# Patient Record
Sex: FEMALE | Race: WHITE | NOT HISPANIC OR LATINO
[De-identification: names, ages, dates, MRNs, and addresses within clinical notes are randomized per-mention and may not be internally consistent; named-entity substitution may affect disease eponyms.]

---

## 2020-08-31 ENCOUNTER — ERX REFILL RESPONSE (OUTPATIENT)
Age: 45
End: 2020-08-31

## 2020-08-31 RX ORDER — FOLIC ACID 1 MG/1
TAKE 1 TABLET BY MOUTH EVERY DAY TABLET ORAL
Qty: 90 | Refills: 3

## 2020-09-30 ENCOUNTER — TELEPHONE ENCOUNTER (OUTPATIENT)
Dept: URBAN - METROPOLITAN AREA CLINIC 92 | Facility: CLINIC | Age: 45
End: 2020-09-30

## 2020-09-30 ENCOUNTER — OFFICE VISIT (OUTPATIENT)
Dept: URBAN - METROPOLITAN AREA TELEHEALTH 2 | Facility: TELEHEALTH | Age: 45
End: 2020-09-30
Payer: COMMERCIAL

## 2020-09-30 DIAGNOSIS — T88.7XXA DRUG REACTION: ICD-10-CM

## 2020-09-30 DIAGNOSIS — D50.8 ANEMIA, DUE TO INADEQUATE IRON INTAKE: ICD-10-CM

## 2020-09-30 DIAGNOSIS — K51.00 UNIVERSAL ULCERATIVE COLITIS WITHOUT COMPLICATION: ICD-10-CM

## 2020-09-30 DIAGNOSIS — Z09 FOLLOW UP: ICD-10-CM

## 2020-09-30 DIAGNOSIS — D50.9 IRON (FE) DEFICIENCY ANEMIA: ICD-10-CM

## 2020-09-30 DIAGNOSIS — K92.1 HEMATOCHEZIA: ICD-10-CM

## 2020-09-30 DIAGNOSIS — R19.7 DIARRHEA: ICD-10-CM

## 2020-09-30 DIAGNOSIS — M25.50 ARTHRALGIA: ICD-10-CM

## 2020-09-30 PROCEDURE — G9903 PT SCRN TBCO ID AS NON USER: HCPCS | Performed by: INTERNAL MEDICINE

## 2020-09-30 PROCEDURE — 86480 TB TEST CELL IMMUN MEASURE: CPT | Performed by: INTERNAL MEDICINE

## 2020-09-30 PROCEDURE — G8427 DOCREV CUR MEDS BY ELIG CLIN: HCPCS | Performed by: INTERNAL MEDICINE

## 2020-09-30 PROCEDURE — G8482 FLU IMMUNIZE ORDER/ADMIN: HCPCS | Performed by: INTERNAL MEDICINE

## 2020-09-30 PROCEDURE — 3017F COLORECTAL CA SCREEN DOC REV: CPT | Performed by: INTERNAL MEDICINE

## 2020-09-30 PROCEDURE — G8417 CALC BMI ABV UP PARAM F/U: HCPCS | Performed by: INTERNAL MEDICINE

## 2020-09-30 PROCEDURE — G9622 NO UNHEAL ETOH USER: HCPCS | Performed by: INTERNAL MEDICINE

## 2020-09-30 PROCEDURE — 99215 OFFICE O/P EST HI 40 MIN: CPT | Performed by: INTERNAL MEDICINE

## 2020-09-30 PROCEDURE — 1036F TOBACCO NON-USER: CPT | Performed by: INTERNAL MEDICINE

## 2020-09-30 RX ORDER — FOLIC ACID 1 MG/1
TAKE 1 TABLET BY MOUTH EVERY DAY TABLET ORAL
Qty: 90 | Refills: 3 | Status: ACTIVE | COMMUNITY

## 2020-09-30 RX ORDER — TOFACITINIB 10 MG/1
TAKE 1 TABLET (10 MG) BY ORAL ROUTE 2 TIMES PER DAY FOR 30 DAYS TABLET, FILM COATED ORAL 2
Qty: 60 | Refills: 11 | Status: ACTIVE | COMMUNITY
Start: 2020-04-03 | End: 2021-03-29

## 2020-09-30 RX ORDER — DIPHENOXYLATE HYDROCHLORIDE AND ATROPINE SULFATE 2.5; .025 MG/1; MG/1
1 TABLET AS NEEDED TABLET ORAL
Qty: 120 | Refills: 3 | OUTPATIENT
Start: 2020-09-30

## 2020-09-30 RX ORDER — USTEKINUMAB 90 MG/ML
AS DIRECTED INJECTION, SOLUTION SUBCUTANEOUS
Qty: 1 | Refills: 11 | OUTPATIENT
Start: 2020-09-30 | End: 2022-08-03

## 2020-09-30 RX ORDER — OMEPRAZOLE 40 MG/1
TAKE 1 CAPSULE (40 MG) BY ORAL ROUTE ONCE PER DAY BEFORE A MEAL CAPSULE, DELAYED RELEASE PELLETS ORAL 2
Qty: 90 | Refills: 3 | Status: ACTIVE | COMMUNITY
Start: 1900-01-01 | End: 1900-01-01

## 2020-09-30 NOTE — HPI-OTHER HISTORIES
. Pt Sheree is a 45 y/o individual with pan-UC, currently on xeljanz (10 BID started 4/2020) and previously Imuran (100mg), previously on Remicade (started 1/2020) and previously on Entyvio (every 4 weeks) and additional 2 week dose of entyvio, here for refractory disease. . Pt is referred by Dr. Hebert. . She was diagnosed with UC at age 39.  She initially had proctitis, and was started on Lialda.  She did not get better, so started on Remicade.  Remicade worked, but developed muscle spasms on it (for 6 months of therapy).  Was not on therapy for about 1 year, was then hospitalized and found to have pan-UC.  Humira for 3 months then stopped.  She then started on Entyvio (2017).  It helped somewhat.  She has not taken steroids in a while. . Previously on 9/18/2019, she reports that she has 7-10 BM with diarrhea or semi-solid.  Always blood in the stool and tissue.  She has some abdominal pain and cramping, really worse with defecation.  No weight loss.   . Previously on 10/23/2019, pt reports that after starting sulfasalazine, she noticed increased abdominal pain and cramping and worsening diarrhea.  After stopping, her sxs went back to normal.  Having rectal bleeding as well.  Over 10 diarrheal BM per day.  Joint pain is worse, yeimi after infusion (was improved with sulfasalazine). . Previously on 1/8/2020, pt reports that she continues to have 7 BM per day, diarrhea.  She got a 2 week booster dose of entyvio 300mg, which helped her for that day, however, the following day, she is back to her usual flare.  Unable to tolerate sulfasalazine or balsalzide.  Sometimes she has crippling joint pain, perhaps related to entyvio.  Lot of anxiety. . Previously on 2/17/2020, pt reports that she had a reaction to Remicade infusion, 9 days post infusion.  She had severe joint pain, muscle pain, headaches.  She was hospitalized due to severity of sxs.  Joint pain has resolved since stopping entyvio.  Diarrhea has improved after dose of Remicade.  S/p 2 iron infusions.  . Previously on 4/2/2020, pt reports that she got denied by insurance for stelara after multiple appeals.  Over 10 bloody BM per day.  Not on steroids any longer due to COVID concerns. Rectal urgency present.                      .       Previously on 5/4/2020, pt reports that she started on xeljanz about 2 weeks ago, reports that her diarrhea is worse, up to 10BM. . Previously on 5/18/2020, pt reports that she was hospitalized for nearly 1 week for IV steroids and dehydration at [facility].  She was doing well for the past week at home, but then over the weekend, she had recurrence of diarrhea and abdominal pain.  She is on prednisone 40mg. . Today on 9/30/2020, pt reports that she continues to have some blood with tissue mucus like materials.  Having 5-6 BM per day. She has gained 25lbs of weight since we last seen her.  Is not on steroids.  Joint pain improves with Medrol pack. . 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 Labs: 1/2020: IFX AB neg 10/2019: CRP 37, Vedo 15, Stool culture neg and c diff neg 9/2019: Hgb 9.8, ast 8, alt 6,  1/2019: Hgb 9.7, ast 14, alt 8, Crp 21.2, 6-TGN 85, 6-MMP 1518, Esr 14, ferritin 15, Iron sat 5 . 1/2020: The ileum appeared normal. The ascending colon and cecum appeared normal.  Biopsies were taken with a cold forceps for histology.  The pathology specimen was placed into Bottle Number 1. Vega 0. The transverse colon appeared normal.  Biopsies were taken with a cold forceps for histology.  Vega 0.  The pathology specimen was placed into Bottle Number 2. A patchy area of mildly erythematous mucosa was found in the descending colon and at the splenic flexure.  Vega 1. This was biopsied with a cold forceps for histology.  The pathology specimen was placed into Bottle Number 3. Diffuse moderate inflammation characterized by erosions and erythema was found in the rectum, in the recto-sigmoid colon and in the sigmoid colon.  Biopsies were taken with a cold forceps for histology.  The pathology specimen was placed into Bottle Number 4. Vega Score 2. . A. Right Colon , Biopsy: -Colonic mucosa with no diagnostic abnormality.  -No evidence of chronic or active colitis.  -No granulomas seen.  -Negative for dysplasia or malignancy. B. Transverse Colon , Biopsy: -Architectural and inflammatory changes consistent with primary inflammatory bowel disease with patchy moderate activity. -Negative for viral inclusions, dysplasia or malignancy. C. Left Colon , Biopsy: -Architectural and inflammatory changes consistent with primary inflammatory bowel disease with diffuse moderate activity. -Negative for viral inclusions, dysplasia or malignancy. D. Recto-Sigmoid, Colon , Biopsy: -Architectural and inflammatory changes consistent with primary inflammatory bowel disease with diffuse moderate activity. -Negative for viral inclusions, dysplasia or malignancy. . 2/2018: A. DUODENUM (BIOPSY):     SMALL BOWEL MUCOSA WITH NO SIGNIFICANT HISTOPATHOLOGY.     NO HISTOLOGIC EVIDENCE OF CELIAC SPRUE OR INFECTIOUS MICROORGANISMS.  B. STOMACH, ANTRUM (BIOPSY):     GASTRIC MUCOSA WITH NO SIGNIFICANT HISTOPATHOLOGY.     NO HELICOBACTER PYLORI MICROORGANISMS ARE IDENTIFIED WITH A SPECIAL STAIN.  C. ESOPHAGOGASTRIC JUNCTION (BIOPSY):     SQUAMOUS EPITHELIUM WITH NO SIGNIFICANT HISTOPATHOLOGY.  NO GLANDULAR EPITHELIUM SEEN.  AN ALCIAN BLUE/PAS STAIN IS NEGATIVE FOR BOTH INTESTINAL METAPLASIA AND FUNGAL ELEMENTS.  D. TERMINAL ILEUM (BIOPSY):     SMALL BOWEL MUCOSA WITH NO SIGNIFICANT HISTOPATHOLOGY.     NO HISTOLOGIC EVIDENCE OF ACTIVE OR CHRONIC ILEITIS.  NO GRANULOMATA IDENTIFIED.  E. CECUM (BIOPSY):     CHRONIC INACTIVE COLITIS.  NO GRANULOMATA OR DYSPLASIA IDENTIFIED.  SEE COMMENT.  F. COLON, ASCENDING (BIOPSY):     CHRONIC INACTIVE COLITIS.  NO GRANULOMATA OR DYSPLASIA IDENTIFIED.      G. COLON, TRANSVERSE (BIOPSY):     CHRONIC MINIMALLY ACTIVE COLITIS.  NO GRANULOMATA OR DYSPLASIA IDENTIFIED.      H. COLON, DESCENDING (BIOPSY):     CHRONIC MINIMALLY ACTIVE COLITIS.  NO GRANULOMATA OR DYSPLASIA IDENTIFIED.      I. COLON, SIGMOID (BIOPSY):     CHRONIC ACTIVE COLITIS.  NO GRANULOMATA OR DYSPLASIA IDENTIFIED.  J. Rectum CHRONIC ACTIVE PROCTITIS.  NO GRANULOMATA OR DYSPLASIA IDENTIFIED. .

## 2020-10-01 ENCOUNTER — TELEPHONE ENCOUNTER (OUTPATIENT)
Dept: URBAN - METROPOLITAN AREA CLINIC 92 | Facility: CLINIC | Age: 45
End: 2020-10-01

## 2020-10-01 ENCOUNTER — LAB OUTSIDE AN ENCOUNTER (OUTPATIENT)
Dept: URBAN - METROPOLITAN AREA TELEHEALTH 2 | Facility: TELEHEALTH | Age: 45
End: 2020-10-01

## 2020-10-05 ENCOUNTER — CLAIMS CREATED FROM THE CLAIM WINDOW (OUTPATIENT)
Dept: URBAN - METROPOLITAN AREA CLINIC 4 | Facility: CLINIC | Age: 45
End: 2020-10-05
Payer: COMMERCIAL

## 2020-10-05 ENCOUNTER — OFFICE VISIT (OUTPATIENT)
Dept: URBAN - METROPOLITAN AREA SURGERY CENTER 18 | Facility: SURGERY CENTER | Age: 45
End: 2020-10-05
Payer: COMMERCIAL

## 2020-10-05 DIAGNOSIS — K51.50 CHRONIC LEFT-SIDED ULCERATIVE COLITIS: ICD-10-CM

## 2020-10-05 DIAGNOSIS — K63.89 OTHER SPECIFIED DISEASES OF INTESTINE: ICD-10-CM

## 2020-10-05 DIAGNOSIS — K51.919 ULCERATIVE COLITIS, UNSPECIFIED WITH UNSPECIFIED COMPLICATIONS: ICD-10-CM

## 2020-10-05 PROCEDURE — 45331 SIGMOIDOSCOPY AND BIOPSY: CPT | Performed by: INTERNAL MEDICINE

## 2020-10-05 PROCEDURE — G8907 PT DOC NO EVENTS ON DISCHARG: HCPCS | Performed by: INTERNAL MEDICINE

## 2020-10-05 PROCEDURE — 88342 IMHCHEM/IMCYTCHM 1ST ANTB: CPT | Performed by: PATHOLOGY

## 2020-10-05 PROCEDURE — G9937 DIG OR SURV COLSCO: HCPCS | Performed by: INTERNAL MEDICINE

## 2020-10-05 PROCEDURE — 88305 TISSUE EXAM BY PATHOLOGIST: CPT | Performed by: PATHOLOGY

## 2020-10-07 ENCOUNTER — LAB OUTSIDE AN ENCOUNTER (OUTPATIENT)
Dept: URBAN - METROPOLITAN AREA TELEHEALTH 2 | Facility: TELEHEALTH | Age: 45
End: 2020-10-07

## 2020-10-09 LAB
A/G RATIO: 1.5
ALBUMIN: 4.6
ALKALINE PHOSPHATASE: 69
ALT (SGPT): 14
AST (SGOT): 14
BASO (ABSOLUTE): 0.1
BASOS: 1
BILIRUBIN, TOTAL: 0.3
BUN/CREATININE RATIO: 9
BUN: 7
CALCIUM: 9.8
CARBON DIOXIDE, TOTAL: 24
CHLORIDE: 99
CHOLESTEROL, TOTAL: 223
COMMENT:: (no result)
CREATININE: 0.8
EGFR IF AFRICN AM: 102
EGFR IF NONAFRICN AM: 89
EOS (ABSOLUTE): 0.2
EOS: 2
GLOBULIN, TOTAL: 3
GLUCOSE: 80
HDL CHOLESTEROL: 48
HEMATOCRIT: 37.1
HEMATOLOGY COMMENTS:: (no result)
HEMOGLOBIN: 12.3
IMMATURE CELLS: (no result)
IMMATURE GRANS (ABS): 0
IMMATURE GRANULOCYTES: 0
LDL CHOL CALC (NIH): 119
LYMPHS (ABSOLUTE): 1.6
LYMPHS: 15
MCH: 29.5
MCHC: 33.2
MCV: 89
MONOCYTES(ABSOLUTE): 0.4
MONOCYTES: 4
NEUTROPHILS (ABSOLUTE): 8.1
NEUTROPHILS: 78
NRBC: (no result)
PLATELETS: 392
POTASSIUM: 4.8
PROTEIN, TOTAL: 7.6
QUANTIFERON CRITERIA: (no result)
QUANTIFERON INCUBATION: (no result)
QUANTIFERON MITOGEN VALUE: 0.16
QUANTIFERON NIL VALUE: 0.05
QUANTIFERON TB1 AG VALUE: 0.05
QUANTIFERON TB2 AG VALUE: 0.06
QUANTIFERON-TB GOLD PLUS: (no result)
RBC: 4.17
RDW: 13.1
SODIUM: 138
TRIGLYCERIDES: 321
VLDL CHOLESTEROL CAL: 56
WBC: 10.3

## 2020-11-12 ENCOUNTER — TELEPHONE ENCOUNTER (OUTPATIENT)
Dept: URBAN - METROPOLITAN AREA CLINIC 92 | Facility: CLINIC | Age: 45
End: 2020-11-12

## 2020-11-20 ENCOUNTER — OFFICE VISIT (OUTPATIENT)
Dept: URBAN - METROPOLITAN AREA CLINIC 79 | Facility: CLINIC | Age: 45
End: 2020-11-20
Payer: COMMERCIAL

## 2020-11-20 VITALS
HEIGHT: 64 IN | BODY MASS INDEX: 44.22 KG/M2 | HEART RATE: 86 BPM | RESPIRATION RATE: 20 BRPM | WEIGHT: 259 LBS | SYSTOLIC BLOOD PRESSURE: 98 MMHG | TEMPERATURE: 98.8 F | DIASTOLIC BLOOD PRESSURE: 59 MMHG

## 2020-11-20 DIAGNOSIS — K51.80 CHRONIC PANCOLONIC ULCERATIVE COLITIS: ICD-10-CM

## 2020-11-20 PROCEDURE — 96413 CHEMO IV INFUSION 1 HR: CPT | Performed by: INTERNAL MEDICINE

## 2020-11-20 RX ORDER — OMEPRAZOLE 40 MG/1
TAKE 1 CAPSULE (40 MG) BY ORAL ROUTE ONCE PER DAY BEFORE A MEAL CAPSULE, DELAYED RELEASE PELLETS ORAL 2
Qty: 90 | Refills: 3 | Status: ACTIVE | COMMUNITY
Start: 1900-01-01 | End: 1900-01-01

## 2020-11-20 RX ORDER — USTEKINUMAB 90 MG/ML
AS DIRECTED INJECTION, SOLUTION SUBCUTANEOUS
Qty: 1 | Refills: 11 | Status: ACTIVE | COMMUNITY
Start: 2020-09-30 | End: 2022-08-03

## 2020-11-20 RX ORDER — DIPHENOXYLATE HYDROCHLORIDE AND ATROPINE SULFATE 2.5; .025 MG/1; MG/1
1 TABLET AS NEEDED TABLET ORAL
Qty: 120 | Refills: 3 | Status: ACTIVE | COMMUNITY
Start: 2020-09-30

## 2020-11-20 RX ORDER — TOFACITINIB 10 MG/1
TAKE 1 TABLET (10 MG) BY ORAL ROUTE 2 TIMES PER DAY FOR 30 DAYS TABLET, FILM COATED ORAL 2
Qty: 60 | Refills: 11 | Status: ACTIVE | COMMUNITY
Start: 2020-04-03 | End: 2021-03-29

## 2020-11-20 RX ORDER — FOLIC ACID 1 MG/1
TAKE 1 TABLET BY MOUTH EVERY DAY TABLET ORAL
Qty: 90 | Refills: 3 | Status: ACTIVE | COMMUNITY

## 2020-11-30 ENCOUNTER — TELEPHONE ENCOUNTER (OUTPATIENT)
Dept: URBAN - METROPOLITAN AREA CLINIC 92 | Facility: CLINIC | Age: 45
End: 2020-11-30

## 2020-12-16 ENCOUNTER — WEB ENCOUNTER (OUTPATIENT)
Dept: URBAN - METROPOLITAN AREA CLINIC 98 | Facility: CLINIC | Age: 45
End: 2020-12-16

## 2020-12-27 ENCOUNTER — ERX REFILL RESPONSE (OUTPATIENT)
Dept: URBAN - METROPOLITAN AREA CLINIC 13 | Facility: CLINIC | Age: 45
End: 2020-12-27

## 2020-12-27 RX ORDER — DICYCLOMINE HYDROCHLORIDE 20 MG/1
TAKE 1 TABLET BY MOUTH 3 TIMES A DAY TABLET ORAL
Qty: 270 | Refills: 1

## 2020-12-30 ENCOUNTER — TELEPHONE ENCOUNTER (OUTPATIENT)
Dept: URBAN - METROPOLITAN AREA CLINIC 92 | Facility: CLINIC | Age: 45
End: 2020-12-30

## 2020-12-30 RX ORDER — DIPHENOXYLATE HYDROCHLORIDE AND ATROPINE SULFATE 2.5; .025 MG/1; MG/1
1 TABLET AS NEEDED TABLET ORAL
Qty: 120 | Refills: 3
Start: 2020-09-30

## 2021-04-13 ENCOUNTER — OFFICE VISIT (OUTPATIENT)
Dept: URBAN - METROPOLITAN AREA CLINIC 96 | Facility: CLINIC | Age: 46
End: 2021-04-13

## 2021-05-04 ENCOUNTER — LAB OUTSIDE AN ENCOUNTER (OUTPATIENT)
Dept: URBAN - METROPOLITAN AREA CLINIC 96 | Facility: CLINIC | Age: 46
End: 2021-05-04

## 2021-05-04 ENCOUNTER — OFFICE VISIT (OUTPATIENT)
Dept: URBAN - METROPOLITAN AREA CLINIC 96 | Facility: CLINIC | Age: 46
End: 2021-05-04
Payer: COMMERCIAL

## 2021-05-04 ENCOUNTER — WEB ENCOUNTER (OUTPATIENT)
Dept: URBAN - METROPOLITAN AREA CLINIC 96 | Facility: CLINIC | Age: 46
End: 2021-05-04

## 2021-05-04 DIAGNOSIS — R19.7 DIARRHEA: ICD-10-CM

## 2021-05-04 DIAGNOSIS — K92.1 HEMATOCHEZIA: ICD-10-CM

## 2021-05-04 DIAGNOSIS — K51.00 UNIVERSAL ULCERATIVE COLITIS WITHOUT COMPLICATION: ICD-10-CM

## 2021-05-04 DIAGNOSIS — M25.50 ARTHRALGIA: ICD-10-CM

## 2021-05-04 PROCEDURE — 99215 OFFICE O/P EST HI 40 MIN: CPT | Performed by: INTERNAL MEDICINE

## 2021-05-04 RX ORDER — DICYCLOMINE HYDROCHLORIDE 20 MG/1
TAKE 1 TABLET BY MOUTH 3 TIMES A DAY TABLET ORAL
Qty: 270 | Refills: 1 | COMMUNITY

## 2021-05-04 RX ORDER — DIPHENOXYLATE HYDROCHLORIDE AND ATROPINE SULFATE 2.5; .025 MG/1; MG/1
1 TABLET AS NEEDED TABLET ORAL
Qty: 120 | Refills: 3 | COMMUNITY
Start: 2020-09-30

## 2021-05-04 RX ORDER — USTEKINUMAB 90 MG/ML
AS DIRECTED INJECTION, SOLUTION SUBCUTANEOUS
Qty: 1 | Refills: 11 | OUTPATIENT

## 2021-05-04 RX ORDER — OMEPRAZOLE 40 MG/1
TAKE 1 CAPSULE (40 MG) BY ORAL ROUTE ONCE PER DAY BEFORE A MEAL CAPSULE, DELAYED RELEASE PELLETS ORAL 2
Qty: 90 | Refills: 3 | COMMUNITY
Start: 1900-01-01

## 2021-05-04 RX ORDER — USTEKINUMAB 90 MG/ML
AS DIRECTED INJECTION, SOLUTION SUBCUTANEOUS
Qty: 1 | Refills: 11 | COMMUNITY
Start: 2020-09-30 | End: 2022-08-03

## 2021-05-04 RX ORDER — FOLIC ACID 1 MG/1
TAKE 1 TABLET BY MOUTH EVERY DAY TABLET ORAL
Qty: 90 | Refills: 3 | COMMUNITY

## 2021-05-04 RX ORDER — PANTOPRAZOLE SODIUM 40 MG/1
1 TABLET TABLET, DELAYED RELEASE ORAL BID
Qty: 60 TABLET | Refills: 11 | OUTPATIENT
Start: 2021-05-04

## 2021-05-04 NOTE — HPI-TODAY'S VISIT:
. Pt Sheree is a 46 y/o individual with pan-UC, currently on Stelara (started on 1/2021) and previously xeljanz (10 BID started 4/2020) and previously Imuran (100mg), previously on Remicade (started 1/2020) and previously on Entyvio (every 4 weeks) and additional 2 week dose of entyvio, here for refractory disease. . Pt is referred by Dr. Hebert. . She was diagnosed with UC at age 39.  She initially had proctitis, and was started on Lialda.  She did not get better, so started on Remicade.  Remicade worked, but developed muscle spasms on it (for 6 months of therapy).  Was not on therapy for about 1 year, was then hospitalized and found to have pan-UC.  Humira for 3 months then stopped.  She then started on Entyvio (2017).  It helped somewhat.  She has not taken steroids in a while.  She then went on Entyvio and then went to xeljanz (started 4/2020 and stopped 12/2020). . Previously on 9/18/2019, she reports that she has 7-10 BM with diarrhea or semi-solid.  Always blood in the stool and tissue.  She has some abdominal pain and cramping, really worse with defecation.  No weight loss.   . Previously on 10/23/2019, pt reports that after starting sulfasalazine, she noticed increased abdominal pain and cramping and worsening diarrhea.  After stopping, her sxs went back to normal.  Having rectal bleeding as well.  Over 10 diarrheal BM per day.  Joint pain is worse, yeimi after infusion (was improved with sulfasalazine). . Previously on 1/8/2020, pt reports that she continues to have 7 BM per day, diarrhea.  She got a 2 week booster dose of entyvio 300mg, which helped her for that day, however, the following day, she is back to her usual flare.  Unable to tolerate sulfasalazine or balsalzide.  Sometimes she has crippling joint pain, perhaps related to entyvio.  Lot of anxiety. . Previously on 2/17/2020, pt reports that she had a reaction to Remicade infusion, 9 days post infusion.  She had severe joint pain, muscle pain, headaches.  She was hospitalized due to severity of sxs.  Joint pain has resolved since stopping entyvio.  Diarrhea has improved after dose of Remicade.  S/p 2 iron infusions.  . Previously on 4/2/2020, pt reports that she got denied by insurance for stelara after multiple appeals.  Over 10 bloody BM per day.  Not on steroids any longer due to COVID concerns. Rectal urgency present.                      .       Previously on 5/4/2020, pt reports that she started on xeljanz about 2 weeks ago, reports that her diarrhea is worse, up to 10BM. . Previously on 5/18/2020, pt reports that she was hospitalized for nearly 1 week for IV steroids and dehydration at [facility].  She was doing well for the past week at home, but then over the weekend, she had recurrence of diarrhea and abdominal pain.  She is on prednisone 40mg. . Previously on 9/30/2020, pt reports that she continues to have some blood with tissue mucus like materials.  Having 5-6 BM per day. She has gained 25lbs of weight since we last seen her.  Is not on steroids. . Today on 5/4/2021, pt reports that she is better on Stelara, but still has blood, tissue, 5 BM per day.  She is 50percent better, but not completely.  She has restricted her diet.  Joint pain improves with Medrol pack. . 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 Labs: 1/2020: IFX AB neg 10/2019: CRP 37, Vedo 15, Stool culture neg and c diff neg 9/2019: Hgb 9.8, ast 8, alt 6,  1/2019: Hgb 9.7, ast 14, alt 8, Crp 21.2, 6-TGN 85, 6-MMP 1518, Esr 14, ferritin 15, Iron sat 5 . 1/2020: The ileum appeared normal. The ascending colon and cecum appeared normal.  Biopsies were taken with a cold forceps for histology.  The pathology specimen was placed into Bottle Number 1. Vega 0. The transverse colon appeared normal.  Biopsies were taken with a cold forceps for histology.  Vega 0.  The pathology specimen was placed into Bottle Number 2. A patchy area of mildly erythematous mucosa was found in the descending colon and at the splenic flexure.  Vega 1. This was biopsied with a cold forceps for histology.  The pathology specimen was placed into Bottle Number 3. Diffuse moderate inflammation characterized by erosions and erythema was found in the rectum, in the recto-sigmoid colon and in the sigmoid colon.  Biopsies were taken with a cold forceps for histology.  The pathology specimen was placed into Bottle Number 4. Vega Score 2. . A. Right Colon , Biopsy: -Colonic mucosa with no diagnostic abnormality.  -No evidence of chronic or active colitis.  -No granulomas seen.  -Negative for dysplasia or malignancy. B. Transverse Colon , Biopsy: -Architectural and inflammatory changes consistent with primary inflammatory bowel disease with patchy moderate activity. -Negative for viral inclusions, dysplasia or malignancy. C. Left Colon , Biopsy: -Architectural and inflammatory changes consistent with primary inflammatory bowel disease with diffuse moderate activity. -Negative for viral inclusions, dysplasia or malignancy. D. Recto-Sigmoid, Colon , Biopsy: -Architectural and inflammatory changes consistent with primary inflammatory bowel disease with diffuse moderate activity. -Negative for viral inclusions, dysplasia or malignancy. . 2/2018: A. DUODENUM (BIOPSY):     SMALL BOWEL MUCOSA WITH NO SIGNIFICANT HISTOPATHOLOGY.     NO HISTOLOGIC EVIDENCE OF CELIAC SPRUE OR INFECTIOUS MICROORGANISMS.  B. STOMACH, ANTRUM (BIOPSY):     GASTRIC MUCOSA WITH NO SIGNIFICANT HISTOPATHOLOGY.     NO HELICOBACTER PYLORI MICROORGANISMS ARE IDENTIFIED WITH A SPECIAL STAIN.  C. ESOPHAGOGASTRIC JUNCTION (BIOPSY):     SQUAMOUS EPITHELIUM WITH NO SIGNIFICANT HISTOPATHOLOGY.  NO GLANDULAR EPITHELIUM SEEN.  AN ALCIAN BLUE/PAS STAIN IS NEGATIVE FOR BOTH INTESTINAL METAPLASIA AND FUNGAL ELEMENTS.  D. TERMINAL ILEUM (BIOPSY):     SMALL BOWEL MUCOSA WITH NO SIGNIFICANT HISTOPATHOLOGY.     NO HISTOLOGIC EVIDENCE OF ACTIVE OR CHRONIC ILEITIS.  NO GRANULOMATA IDENTIFIED.  E. CECUM (BIOPSY):     CHRONIC INACTIVE COLITIS.  NO GRANULOMATA OR DYSPLASIA IDENTIFIED.  SEE COMMENT.  F. COLON, ASCENDING (BIOPSY):     CHRONIC INACTIVE COLITIS.  NO GRANULOMATA OR DYSPLASIA IDENTIFIED.      G. COLON, TRANSVERSE (BIOPSY):     CHRONIC MINIMALLY ACTIVE COLITIS.  NO GRANULOMATA OR DYSPLASIA IDENTIFIED.      H. COLON, DESCENDING (BIOPSY):     CHRONIC MINIMALLY ACTIVE COLITIS.  NO GRANULOMATA OR DYSPLASIA IDENTIFIED.      I. COLON, SIGMOID (BIOPSY):     CHRONIC ACTIVE COLITIS.  NO GRANULOMATA OR DYSPLASIA IDENTIFIED.  J. Rectum CHRONIC ACTIVE PROCTITIS.  NO GRANULOMATA OR DYSPLASIA IDENTIFIED. .

## 2021-05-05 LAB
ABSOLUTE BASOPHIL COUNT: 0.07
ABSOLUTE EOSINOPHIL COUNT: 0.24
ABSOLUTE IMMATURE GRANULOCYTE  COUNT: 0.09
ABSOLUTE LYMPHOCYTE COUNT: 2.02
ABSOLUTE MONOCYTE COUNT: 0.56
ABSOLUTE NEUTROPHIL COUNT (ANC): 8.26
ABSOLUTE NRBC  COUNT: 0
BASOPHIL AUTO: 1
CRP: 3.36
EOS AUTO: 2
FERRITIN LEVEL: 11.2
HCT: 35.2
HGB: 10.7
IMMATURE GRANULOCYTES AUTO: 0.8
IRON LEVEL: 24
IRON SAT: 6
LYMPH AUTO: 18
MCH: 23.9
MCHC: 30.4
MCV: 78.6
MONO AUTO: 5
MPV: 10
NEUTRO AUTO: 74
NRBC AUTO: 0
PERFORMING LAB: (no result)
PLATELETS: 388
RBC: 4.48
RDW: 15.8
TIBC: 401
VITAMIN B12 LEVEL: 399
VITAMIN D 25 OH: 18
WBC: 11.2

## 2021-05-06 LAB
PERFORMING LAB: (no result)
SOURCE: (no result)

## 2021-06-23 ENCOUNTER — TELEPHONE ENCOUNTER (OUTPATIENT)
Dept: URBAN - METROPOLITAN AREA CLINIC 92 | Facility: CLINIC | Age: 46
End: 2021-06-23

## 2021-06-24 ENCOUNTER — WEB ENCOUNTER (OUTPATIENT)
Dept: URBAN - METROPOLITAN AREA CLINIC 96 | Facility: CLINIC | Age: 46
End: 2021-06-24

## 2021-06-24 RX ORDER — METRONIDAZOLE 500 MG/1
1 TABLET TABLET, FILM COATED ORAL
Qty: 28 TABLET | Refills: 0 | OUTPATIENT
Start: 2021-06-27 | End: 2021-07-11

## 2021-06-24 RX ORDER — CIPROFLOXACIN HYDROCHLORIDE 500 MG/1
1 TABLET TABLET, FILM COATED ORAL
Qty: 28 TABLET | Refills: 0 | OUTPATIENT
Start: 2021-06-27 | End: 2021-07-11

## 2021-06-29 ENCOUNTER — LAB OUTSIDE AN ENCOUNTER (OUTPATIENT)
Dept: URBAN - NONMETROPOLITAN AREA CLINIC 2 | Facility: CLINIC | Age: 46
End: 2021-06-29

## 2021-07-01 ENCOUNTER — WEB ENCOUNTER (OUTPATIENT)
Dept: URBAN - METROPOLITAN AREA CLINIC 96 | Facility: CLINIC | Age: 46
End: 2021-07-01

## 2021-07-01 LAB
C DIFFICILE TOXIN GENE NAA: (no result)
REQUEST PROBLEM: (no result)

## 2021-07-02 LAB
C DIFFICILE TOXINS A+B, EIA: NEGATIVE
Lab: (no result)
WRITTEN AUTHORIZATION: (no result)

## 2021-07-06 ENCOUNTER — LAB OUTSIDE AN ENCOUNTER (OUTPATIENT)
Dept: URBAN - METROPOLITAN AREA CLINIC 96 | Facility: CLINIC | Age: 46
End: 2021-07-06

## 2021-07-06 ENCOUNTER — OFFICE VISIT (OUTPATIENT)
Dept: URBAN - METROPOLITAN AREA CLINIC 96 | Facility: CLINIC | Age: 46
End: 2021-07-06
Payer: COMMERCIAL

## 2021-07-06 DIAGNOSIS — K51.00 UNIVERSAL ULCERATIVE COLITIS WITHOUT COMPLICATION: ICD-10-CM

## 2021-07-06 DIAGNOSIS — D50.9 IRON (FE) DEFICIENCY ANEMIA: ICD-10-CM

## 2021-07-06 DIAGNOSIS — R10.13 DYSPEPSIA: ICD-10-CM

## 2021-07-06 DIAGNOSIS — K21.9 GERD (GASTROESOPHAGEAL REFLUX DISEASE): ICD-10-CM

## 2021-07-06 PROCEDURE — 99215 OFFICE O/P EST HI 40 MIN: CPT | Performed by: INTERNAL MEDICINE

## 2021-07-06 RX ORDER — CIPROFLOXACIN HYDROCHLORIDE 500 MG/1
1 TABLET TABLET, FILM COATED ORAL
Qty: 28 TABLET | Refills: 0 | COMMUNITY
Start: 2021-06-27 | End: 2021-07-11

## 2021-07-06 RX ORDER — USTEKINUMAB 130 MG/26ML
AS DIRECTED SOLUTION INTRAVENOUS ONCE
Qty: 520 MILLIGRAMS | Refills: 0 | OUTPATIENT
Start: 2021-11-23 | End: 2021-11-24

## 2021-07-06 RX ORDER — DICYCLOMINE HYDROCHLORIDE 20 MG/1
TAKE 1 TABLET BY MOUTH 3 TIMES A DAY TABLET ORAL
Qty: 270 | Refills: 1 | COMMUNITY

## 2021-07-06 RX ORDER — USTEKINUMAB 90 MG/ML
AS DIRECTED INJECTION, SOLUTION SUBCUTANEOUS
Qty: 1 | Refills: 11 | OUTPATIENT

## 2021-07-06 RX ORDER — METRONIDAZOLE 500 MG/1
1 TABLET TABLET, FILM COATED ORAL
Qty: 28 TABLET | Refills: 0 | COMMUNITY
Start: 2021-06-27 | End: 2021-07-11

## 2021-07-06 RX ORDER — FOLIC ACID 1 MG/1
TAKE 1 TABLET BY MOUTH EVERY DAY TABLET ORAL
Qty: 90 | Refills: 3 | COMMUNITY

## 2021-07-06 RX ORDER — PANTOPRAZOLE SODIUM 40 MG/1
1 TABLET TABLET, DELAYED RELEASE ORAL BID
Qty: 60 TABLET | Refills: 11 | COMMUNITY
Start: 2021-05-04

## 2021-07-06 RX ORDER — USTEKINUMAB 90 MG/ML
AS DIRECTED INJECTION, SOLUTION SUBCUTANEOUS
Qty: 1 | Refills: 11 | COMMUNITY

## 2021-07-06 RX ORDER — ONDANSETRON HYDROCHLORIDE 4 MG/1
1 TABLET TABLET, FILM COATED ORAL
Qty: 60 | Refills: 2 | OUTPATIENT
Start: 2021-07-06

## 2021-07-06 RX ORDER — DIPHENOXYLATE HYDROCHLORIDE AND ATROPINE SULFATE 2.5; .025 MG/1; MG/1
1 TABLET AS NEEDED TABLET ORAL
Qty: 120 | Refills: 3 | COMMUNITY
Start: 2020-09-30

## 2021-07-06 RX ORDER — PANTOPRAZOLE SODIUM 40 MG/1
1 TABLET TABLET, DELAYED RELEASE ORAL BID
Qty: 60 TABLET | Refills: 11 | OUTPATIENT

## 2021-07-06 RX ORDER — POLYETHYLENE GLYCOL 3350, SODIUM SULFATE, SODIUM CHLORIDE, POTASSIUM CHLORIDE, ASCORBIC ACID, SODIUM ASCORBATE 140-9-5.2G
1 KIT KIT ORAL ONCE
Qty: 1 | Refills: 1 | OUTPATIENT
Start: 2021-07-06 | End: 2021-07-08

## 2021-07-06 RX ORDER — OMEPRAZOLE 40 MG/1
TAKE 1 CAPSULE (40 MG) BY ORAL ROUTE ONCE PER DAY BEFORE A MEAL CAPSULE, DELAYED RELEASE PELLETS ORAL 2
Qty: 90 | Refills: 3 | COMMUNITY
Start: 1900-01-01

## 2021-07-06 NOTE — HPI-TODAY'S VISIT:
Pt Sheree is a 44 y/o individual with pan-UC, currently on Stelara (started on 1/2021) and previously xeljanz (10 BID started 4/2020) and previously Imuran (100mg), previously on Remicade (started 1/2020) and previously on Entyvio (every 4 weeks) and additional 2 week dose of entyvio, here for refractory disease. . Pt is referred by Dr. Hebert. . She was diagnosed with UC at age 39.  She initially had proctitis, and was started on Lialda.  She did not get better, so started on Remicade.  Remicade worked, but developed muscle spasms on it (for 6 months of therapy).  Was not on therapy for about 1 year, was then hospitalized and found to have pan-UC.  Humira for 3 months then stopped.  She then started on Entyvio (2017).  It helped somewhat.  She has not taken steroids in a while.  She then went on Entyvio and then went to xeljanz (started 4/2020 and stopped 12/2020). . Previously on 9/18/2019, she reports that she has 7-10 BM with diarrhea or semi-solid.  Always blood in the stool and tissue.  She has some abdominal pain and cramping, really worse with defecation.  No weight loss.   . Previously on 10/23/2019, pt reports that after starting sulfasalazine, she noticed increased abdominal pain and cramping and worsening diarrhea.  After stopping, her sxs went back to normal.  Having rectal bleeding as well.  Over 10 diarrheal BM per day.  Joint pain is worse, yeimi after infusion (was improved with sulfasalazine). . Previously on 1/8/2020, pt reports that she continues to have 7 BM per day, diarrhea.  She got a 2 week booster dose of entyvio 300mg, which helped her for that day, however, the following day, she is back to her usual flare.  Unable to tolerate sulfasalazine or balsalzide.  Sometimes she has crippling joint pain, perhaps related to entyvio.  Lot of anxiety. . Previously on 2/17/2020, pt reports that she had a reaction to Remicade infusion, 9 days post infusion.  She had severe joint pain, muscle pain, headaches.  She was hospitalized due to severity of sxs.  Joint pain has resolved since stopping entyvio.  Diarrhea has improved after dose of Remicade.  S/p 2 iron infusions.  . Previously on 4/2/2020, pt reports that she got denied by insurance for stelara after multiple appeals.  Over 10 bloody BM per day.  Not on steroids any longer due to COVID concerns. Rectal urgency present.                      .       Previously on 5/4/2020, pt reports that she started on xeljanz about 2 weeks ago, reports that her diarrhea is worse, up to 10BM. . Previously on 5/18/2020, pt reports that she was hospitalized for nearly 1 week for IV steroids and dehydration at [facility].  She was doing well for the past week at home, but then over the weekend, she had recurrence of diarrhea and abdominal pain.  She is on prednisone 40mg. . Previously on 9/30/2020, pt reports that she continues to have some blood with tissue mucus like materials.  Having 5-6 BM per day. She has gained 25lbs of weight since we last seen her.  Is not on steroids. . Previously on 5/4/2021, pt reports that she is better on Stelara, but still has blood, tissue, 5 BM per day.  She is 50percent better, but not completely.  She has restricted her diet. . Today on 7/6/2021, pt reports that her BM are increased up to 20 per day.  Blood in the stool and mucus. .  Joint pain improves with Medrol pack. . 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 Labs: 5/2021: stelara 0.6 (no Ab) . 1/2020: IFX AB neg 10/2019: CRP 37, Vedo 15, Stool culture neg and c diff neg 9/2019: Hgb 9.8, ast 8, alt 6,  1/2019: Hgb 9.7, ast 14, alt 8, Crp 21.2, 6-TGN 85, 6-MMP 1518, Esr 14, ferritin 15, Iron sat 5 . 1/2020: The ileum appeared normal. The ascending colon and cecum appeared normal.  Biopsies were taken with a cold forceps for histology.  The pathology specimen was placed into Bottle Number 1. Vega 0. The transverse colon appeared normal.  Biopsies were taken with a cold forceps for histology.  Vega 0.  The pathology specimen was placed into Bottle Number 2. A patchy area of mildly erythematous mucosa was found in the descending colon and at the splenic flexure.  Vega 1. This was biopsied with a cold forceps for histology.  The pathology specimen was placed into Bottle Number 3. Diffuse moderate inflammation characterized by erosions and erythema was found in the rectum, in the recto-sigmoid colon and in the sigmoid colon.  Biopsies were taken with a cold forceps for histology.  The pathology specimen was placed into Bottle Number 4. Vega Score 2. . A. Right Colon , Biopsy: -Colonic mucosa with no diagnostic abnormality.  -No evidence of chronic or active colitis.  -No granulomas seen.  -Negative for dysplasia or malignancy. B. Transverse Colon , Biopsy: -Architectural and inflammatory changes consistent with primary inflammatory bowel disease with patchy moderate activity. -Negative for viral inclusions, dysplasia or malignancy. C. Left Colon , Biopsy: -Architectural and inflammatory changes consistent with primary inflammatory bowel disease with diffuse moderate activity. -Negative for viral inclusions, dysplasia or malignancy. D. Recto-Sigmoid, Colon , Biopsy: -Architectural and inflammatory changes consistent with primary inflammatory bowel disease with diffuse moderate activity. -Negative for viral inclusions, dysplasia or malignancy. . 2/2018: A. DUODENUM (BIOPSY):     SMALL BOWEL MUCOSA WITH NO SIGNIFICANT HISTOPATHOLOGY.     NO HISTOLOGIC EVIDENCE OF CELIAC SPRUE OR INFECTIOUS MICROORGANISMS.  B. STOMACH, ANTRUM (BIOPSY):     GASTRIC MUCOSA WITH NO SIGNIFICANT HISTOPATHOLOGY.     NO HELICOBACTER PYLORI MICROORGANISMS ARE IDENTIFIED WITH A SPECIAL STAIN.  C. ESOPHAGOGASTRIC JUNCTION (BIOPSY):     SQUAMOUS EPITHELIUM WITH NO SIGNIFICANT HISTOPATHOLOGY.  NO GLANDULAR EPITHELIUM SEEN.  AN ALCIAN BLUE/PAS STAIN IS NEGATIVE FOR BOTH INTESTINAL METAPLASIA AND FUNGAL ELEMENTS.  D. TERMINAL ILEUM (BIOPSY):     SMALL BOWEL MUCOSA WITH NO SIGNIFICANT HISTOPATHOLOGY.     NO HISTOLOGIC EVIDENCE OF ACTIVE OR CHRONIC ILEITIS.  NO GRANULOMATA IDENTIFIED.  E. CECUM (BIOPSY):     CHRONIC INACTIVE COLITIS.  NO GRANULOMATA OR DYSPLASIA IDENTIFIED.  SEE COMMENT.  F. COLON, ASCENDING (BIOPSY):     CHRONIC INACTIVE COLITIS.  NO GRANULOMATA OR DYSPLASIA IDENTIFIED.      G. COLON, TRANSVERSE (BIOPSY):     CHRONIC MINIMALLY ACTIVE COLITIS.  NO GRANULOMATA OR DYSPLASIA IDENTIFIED.      H. COLON, DESCENDING (BIOPSY):     CHRONIC MINIMALLY ACTIVE COLITIS.  NO GRANULOMATA OR DYSPLASIA IDENTIFIED.      I. COLON, SIGMOID (BIOPSY):     CHRONIC ACTIVE COLITIS.  NO GRANULOMATA OR DYSPLASIA IDENTIFIED.  J. Rectum CHRONIC ACTIVE PROCTITIS.  NO GRANULOMATA OR DYSPLASIA IDENTIFIED. .

## 2021-07-08 ENCOUNTER — TELEPHONE ENCOUNTER (OUTPATIENT)
Dept: URBAN - METROPOLITAN AREA CLINIC 92 | Facility: CLINIC | Age: 46
End: 2021-07-08

## 2021-07-08 RX ORDER — ONDANSETRON HYDROCHLORIDE 4 MG/1
1 TABLET TABLET, FILM COATED ORAL
Qty: 60 | Refills: 1 | OUTPATIENT
Start: 2021-07-08 | End: 2021-09-06

## 2021-07-08 RX ORDER — PREDNISONE 10 MG/1
2 TABLETS TABLET ORAL ONCE A DAY
Qty: 60 | Refills: 0 | OUTPATIENT
Start: 2021-07-08 | End: 2021-08-07

## 2021-07-09 LAB
BASO (ABSOLUTE): 0.1
BASOS: 1
C-REACTIVE PROTEIN, QUANT: 24
EOS (ABSOLUTE): 0.9
EOS: 8
HEMATOCRIT: 32.7
HEMATOLOGY COMMENTS:: (no result)
HEMOGLOBIN: 9.3
IMMATURE CELLS: (no result)
IMMATURE GRANS (ABS): 0.1
IMMATURE GRANULOCYTES: 1
LYMPHS (ABSOLUTE): 2.5
LYMPHS: 21
MCH: 22.4
MCHC: 28.4
MCV: 79
MONOCYTES(ABSOLUTE): 0.7
MONOCYTES: 6
NEUTROPHILS (ABSOLUTE): 7.6
NEUTROPHILS: 63
NRBC: (no result)
PLATELETS: 439
QUANTIFERON CRITERIA: (no result)
QUANTIFERON INCUBATION: (no result)
QUANTIFERON MITOGEN VALUE: >10
QUANTIFERON NIL VALUE: 0
QUANTIFERON TB1 AG VALUE: 0.01
QUANTIFERON TB2 AG VALUE: 0
QUANTIFERON-TB GOLD PLUS: NEGATIVE
RBC: 4.15
RDW: 16.6
WBC: 12

## 2021-07-13 ENCOUNTER — LAB OUTSIDE AN ENCOUNTER (OUTPATIENT)
Dept: URBAN - METROPOLITAN AREA CLINIC 96 | Facility: CLINIC | Age: 46
End: 2021-07-13

## 2021-08-06 ENCOUNTER — OFFICE VISIT (OUTPATIENT)
Dept: URBAN - METROPOLITAN AREA CLINIC 79 | Facility: CLINIC | Age: 46
End: 2021-08-06
Payer: COMMERCIAL

## 2021-08-06 VITALS
HEART RATE: 97 BPM | TEMPERATURE: 97.7 F | DIASTOLIC BLOOD PRESSURE: 69 MMHG | BODY MASS INDEX: 42.51 KG/M2 | WEIGHT: 249 LBS | HEIGHT: 64 IN | SYSTOLIC BLOOD PRESSURE: 131 MMHG | RESPIRATION RATE: 20 BRPM

## 2021-08-06 DIAGNOSIS — K51.80 CHRONIC PANCOLONIC ULCERATIVE COLITIS: ICD-10-CM

## 2021-08-06 PROCEDURE — 96365 THER/PROPH/DIAG IV INF INIT: CPT | Performed by: INTERNAL MEDICINE

## 2021-08-06 RX ORDER — FOLIC ACID 1 MG/1
TAKE 1 TABLET BY MOUTH EVERY DAY TABLET ORAL
Qty: 90 | Refills: 3 | COMMUNITY

## 2021-08-06 RX ORDER — OMEPRAZOLE 40 MG/1
TAKE 1 CAPSULE (40 MG) BY ORAL ROUTE ONCE PER DAY BEFORE A MEAL CAPSULE, DELAYED RELEASE PELLETS ORAL 2
Qty: 90 | Refills: 3 | COMMUNITY
Start: 1900-01-01

## 2021-08-06 RX ORDER — DICYCLOMINE HYDROCHLORIDE 20 MG/1
TAKE 1 TABLET BY MOUTH 3 TIMES A DAY TABLET ORAL
Qty: 270 | Refills: 1 | COMMUNITY

## 2021-08-06 RX ORDER — ONDANSETRON HYDROCHLORIDE 4 MG/1
1 TABLET TABLET, FILM COATED ORAL
Qty: 60 | Refills: 2 | Status: ACTIVE | COMMUNITY
Start: 2021-07-06

## 2021-08-06 RX ORDER — USTEKINUMAB 90 MG/ML
AS DIRECTED INJECTION, SOLUTION SUBCUTANEOUS
Qty: 1 | Refills: 11 | Status: ACTIVE | COMMUNITY

## 2021-08-06 RX ORDER — ONDANSETRON HYDROCHLORIDE 4 MG/1
1 TABLET TABLET, FILM COATED ORAL
Qty: 60 | Refills: 1 | Status: ACTIVE | COMMUNITY
Start: 2021-07-08 | End: 2021-09-06

## 2021-08-06 RX ORDER — PREDNISONE 10 MG/1
2 TABLETS TABLET ORAL ONCE A DAY
Qty: 60 | Refills: 0 | Status: ACTIVE | COMMUNITY
Start: 2021-07-08 | End: 2021-08-07

## 2021-08-06 RX ORDER — PANTOPRAZOLE SODIUM 40 MG/1
1 TABLET TABLET, DELAYED RELEASE ORAL BID
Qty: 60 TABLET | Refills: 11 | Status: ACTIVE | COMMUNITY

## 2021-08-06 RX ORDER — DIPHENOXYLATE HYDROCHLORIDE AND ATROPINE SULFATE 2.5; .025 MG/1; MG/1
1 TABLET AS NEEDED TABLET ORAL
Qty: 120 | Refills: 3 | COMMUNITY
Start: 2020-09-30

## 2021-09-23 ENCOUNTER — TELEPHONE ENCOUNTER (OUTPATIENT)
Dept: URBAN - METROPOLITAN AREA CLINIC 92 | Facility: CLINIC | Age: 46
End: 2021-09-23

## 2021-10-06 ENCOUNTER — TELEPHONE ENCOUNTER (OUTPATIENT)
Dept: URBAN - METROPOLITAN AREA CLINIC 92 | Facility: CLINIC | Age: 46
End: 2021-10-06

## 2021-10-15 ENCOUNTER — TELEPHONE ENCOUNTER (OUTPATIENT)
Dept: URBAN - METROPOLITAN AREA CLINIC 92 | Facility: CLINIC | Age: 46
End: 2021-10-15

## 2021-10-21 ENCOUNTER — TELEPHONE ENCOUNTER (OUTPATIENT)
Dept: URBAN - METROPOLITAN AREA CLINIC 92 | Facility: CLINIC | Age: 46
End: 2021-10-21

## 2021-11-01 ENCOUNTER — TELEPHONE ENCOUNTER (OUTPATIENT)
Dept: URBAN - METROPOLITAN AREA CLINIC 92 | Facility: CLINIC | Age: 46
End: 2021-11-01

## 2021-11-08 ENCOUNTER — TELEPHONE ENCOUNTER (OUTPATIENT)
Dept: URBAN - METROPOLITAN AREA CLINIC 92 | Facility: CLINIC | Age: 46
End: 2021-11-08

## 2021-11-09 ENCOUNTER — TELEPHONE ENCOUNTER (OUTPATIENT)
Dept: URBAN - METROPOLITAN AREA CLINIC 92 | Facility: CLINIC | Age: 46
End: 2021-11-09

## 2021-11-09 RX ORDER — USTEKINUMAB 90 MG/ML
AS DIRECTED INJECTION, SOLUTION SUBCUTANEOUS
Qty: 1 | Refills: 11
End: 2023-09-12

## 2021-11-18 LAB
ANTI-USTEKINUMAB ANTIBODY: <40
USTEKINUMAB: 1.7

## 2021-11-19 ENCOUNTER — TELEPHONE ENCOUNTER (OUTPATIENT)
Dept: URBAN - METROPOLITAN AREA CLINIC 92 | Facility: CLINIC | Age: 46
End: 2021-11-19

## 2021-11-22 ENCOUNTER — TELEPHONE ENCOUNTER (OUTPATIENT)
Dept: URBAN - METROPOLITAN AREA CLINIC 96 | Facility: CLINIC | Age: 46
End: 2021-11-22

## 2021-12-07 ENCOUNTER — OFFICE VISIT (OUTPATIENT)
Dept: URBAN - METROPOLITAN AREA CLINIC 96 | Facility: CLINIC | Age: 46
End: 2021-12-07
Payer: COMMERCIAL

## 2021-12-07 DIAGNOSIS — R19.7 DIARRHEA: ICD-10-CM

## 2021-12-07 DIAGNOSIS — M25.50 ARTHRALGIA: ICD-10-CM

## 2021-12-07 DIAGNOSIS — K21.9 GERD (GASTROESOPHAGEAL REFLUX DISEASE): ICD-10-CM

## 2021-12-07 DIAGNOSIS — R10.13 DYSPEPSIA: ICD-10-CM

## 2021-12-07 DIAGNOSIS — K51.00 UNIVERSAL ULCERATIVE COLITIS WITHOUT COMPLICATION: ICD-10-CM

## 2021-12-07 DIAGNOSIS — D50.9 IRON (FE) DEFICIENCY ANEMIA: ICD-10-CM

## 2021-12-07 DIAGNOSIS — Z09 FOLLOW UP: ICD-10-CM

## 2021-12-07 DIAGNOSIS — K92.1 HEMATOCHEZIA: ICD-10-CM

## 2021-12-07 PROCEDURE — 99215 OFFICE O/P EST HI 40 MIN: CPT | Performed by: INTERNAL MEDICINE

## 2021-12-07 RX ORDER — DICYCLOMINE HYDROCHLORIDE 20 MG/1
TAKE 1 TABLET BY MOUTH 3 TIMES A DAY TABLET ORAL
Qty: 270 | Refills: 1 | Status: ACTIVE | COMMUNITY

## 2021-12-07 RX ORDER — DIPHENOXYLATE HYDROCHLORIDE AND ATROPINE SULFATE 2.5; .025 MG/1; MG/1
1 TABLET AS NEEDED TABLET ORAL
Qty: 120 | Refills: 3 | Status: ACTIVE | COMMUNITY
Start: 2020-09-30

## 2021-12-07 RX ORDER — OMEPRAZOLE 40 MG/1
TAKE 1 CAPSULE (40 MG) BY ORAL ROUTE ONCE PER DAY BEFORE A MEAL CAPSULE, DELAYED RELEASE PELLETS ORAL 2
Qty: 90 | Refills: 3 | Status: DISCONTINUED | COMMUNITY
Start: 1900-01-01

## 2021-12-07 RX ORDER — USTEKINUMAB 90 MG/ML
AS DIRECTED INJECTION, SOLUTION SUBCUTANEOUS
Qty: 1 | Refills: 11 | OUTPATIENT
Start: 2021-12-07 | End: 2023-10-10

## 2021-12-07 RX ORDER — DIPHENOXYLATE HYDROCHLORIDE AND ATROPINE SULFATE 2.5; .025 MG/1; MG/1
1 TABLET AS NEEDED TABLET ORAL
Qty: 120 | Refills: 3 | OUTPATIENT
Start: 2021-12-07

## 2021-12-07 RX ORDER — PANTOPRAZOLE SODIUM 40 MG/1
1 TABLET TABLET, DELAYED RELEASE ORAL BID
Qty: 60 TABLET | Refills: 11 | Status: ACTIVE | COMMUNITY

## 2021-12-07 RX ORDER — PANTOPRAZOLE SODIUM 40 MG/1
1 TABLET TABLET, DELAYED RELEASE ORAL BID
Qty: 60 TABLET | Refills: 11 | OUTPATIENT

## 2021-12-07 RX ORDER — FOLIC ACID 1 MG/1
TAKE 1 TABLET BY MOUTH EVERY DAY TABLET ORAL
Qty: 90 | Refills: 3 | Status: DISCONTINUED | COMMUNITY

## 2021-12-07 RX ORDER — ONDANSETRON HYDROCHLORIDE 4 MG/1
1 TABLET TABLET, FILM COATED ORAL
Qty: 60 | Refills: 2 | Status: ACTIVE | COMMUNITY
Start: 2021-07-06

## 2021-12-07 RX ORDER — ONDANSETRON HYDROCHLORIDE 4 MG/1
1 TABLET TABLET, FILM COATED ORAL
Qty: 60 | Refills: 2 | OUTPATIENT

## 2021-12-07 RX ORDER — USTEKINUMAB 90 MG/ML
AS DIRECTED INJECTION, SOLUTION SUBCUTANEOUS
Qty: 1 | Refills: 11 | Status: ACTIVE | COMMUNITY
End: 2023-09-12

## 2021-12-07 NOTE — HPI-TODAY'S VISIT:
Pt Sheree is a 44 y/o individual with pan-UC, currently on Stelara (started on 1/2021) and previously xeljanz (10 BID started 4/2020) and previously Imuran (100mg), previously on Remicade (started 1/2020) and previously on Entyvio (every 4 weeks) and additional 2 week dose of entyvio, here for refractory disease. . Pt is referred by Dr. Hebert. . She was diagnosed with UC at age 39.  She initially had proctitis, and was started on Lialda.  She did not get better, so started on Remicade.  Remicade worked, but developed muscle spasms on it (for 6 months of therapy).  Was not on therapy for about 1 year, was then hospitalized and found to have pan-UC.  Humira for 3 months then stopped.  She then started on Entyvio (2017).  It helped somewhat.  She has not taken steroids in a while.  She then went on Entyvio and then went to xeljanz (started 4/2020 and stopped 12/2020). . Previously on 9/18/2019, she reports that she has 7-10 BM with diarrhea or semi-solid.  Always blood in the stool and tissue.  She has some abdominal pain and cramping, really worse with defecation.  No weight loss.   . Previously on 10/23/2019, pt reports that after starting sulfasalazine, she noticed increased abdominal pain and cramping and worsening diarrhea.  After stopping, her sxs went back to normal.  Having rectal bleeding as well.  Over 10 diarrheal BM per day.  Joint pain is worse, yeimi after infusion (was improved with sulfasalazine). . Previously on 1/8/2020, pt reports that she continues to have 7 BM per day, diarrhea.  She got a 2 week booster dose of entyvio 300mg, which helped her for that day, however, the following day, she is back to her usual flare.  Unable to tolerate sulfasalazine or balsalzide.  Sometimes she has crippling joint pain, perhaps related to entyvio.  Lot of anxiety. . Previously on 2/17/2020, pt reports that she had a reaction to Remicade infusion, 9 days post infusion.  She had severe joint pain, muscle pain, headaches.  She was hospitalized due to severity of sxs.  Joint pain has resolved since stopping entyvio.  Diarrhea has improved after dose of Remicade.  S/p 2 iron infusions.  . Previously on 4/2/2020, pt reports that she got denied by insurance for stelara after multiple appeals.  Over 10 bloody BM per day.  Not on steroids any longer due to COVID concerns. Rectal urgency present.                      .       Previously on 5/4/2020, pt reports that she started on xeljanz about 2 weeks ago, reports that her diarrhea is worse, up to 10BM. . Previously on 5/18/2020, pt reports that she was hospitalized for nearly 1 week for IV steroids and dehydration at [facility].  She was doing well for the past week at home, but then over the weekend, she had recurrence of diarrhea and abdominal pain.  She is on prednisone 40mg. . Previously on 9/30/2020, pt reports that she continues to have some blood with tissue mucus like materials.  Having 5-6 BM per day. She has gained 25lbs of weight since we last seen her.  Is not on steroids. . Previously on 5/4/2021, pt reports that she is better on Stelara, but still has blood, tissue, 5 BM per day.  She is 50percent better, but not completely.  She has restricted her diet. . Previously on 7/6/2021, pt reports that her BM are increased up to 20 per day.  Blood in the stool and mucus. . Today on 12/7/2021, pt reports that she got the booster dose of Stelara in August but then has not get the injection due to insurance denial.  We gave her a sample of the Stelara, which IMPROVED THE DIARRHEA and developed constipation.  However, b/c of delay, now bleeding again. . Joint pain improves with Medrol pack. . 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 Labs: 5/2021: stelara 0.6 (no Ab) . 1/2020: IFX AB neg 10/2019: CRP 37, Vedo 15, Stool culture neg and c diff neg 9/2019: Hgb 9.8, ast 8, alt 6,  1/2019: Hgb 9.7, ast 14, alt 8, Crp 21.2, 6-TGN 85, 6-MMP 1518, Esr 14, ferritin 15, Iron sat 5 . 1/2020: The ileum appeared normal. The ascending colon and cecum appeared normal.  Biopsies were taken with a cold forceps for histology.  The pathology specimen was placed into Bottle Number 1. Vega 0. The transverse colon appeared normal.  Biopsies were taken with a cold forceps for histology.  Vega 0.  The pathology specimen was placed into Bottle Number 2. A patchy area of mildly erythematous mucosa was found in the descending colon and at the splenic flexure.  Vega 1. This was biopsied with a cold forceps for histology.  The pathology specimen was placed into Bottle Number 3. Diffuse moderate inflammation characterized by erosions and erythema was found in the rectum, in the recto-sigmoid colon and in the sigmoid colon.  Biopsies were taken with a cold forceps for histology.  The pathology specimen was placed into Bottle Number 4. Vega Score 2. . A. Right Colon , Biopsy: -Colonic mucosa with no diagnostic abnormality.  -No evidence of chronic or active colitis.  -No granulomas seen.  -Negative for dysplasia or malignancy. B. Transverse Colon , Biopsy: -Architectural and inflammatory changes consistent with primary inflammatory bowel disease with patchy moderate activity. -Negative for viral inclusions, dysplasia or malignancy. C. Left Colon , Biopsy: -Architectural and inflammatory changes consistent with primary inflammatory bowel disease with diffuse moderate activity. -Negative for viral inclusions, dysplasia or malignancy. D. Recto-Sigmoid, Colon , Biopsy: -Architectural and inflammatory changes consistent with primary inflammatory bowel disease with diffuse moderate activity. -Negative for viral inclusions, dysplasia or malignancy. . 2/2018: A. DUODENUM (BIOPSY):     SMALL BOWEL MUCOSA WITH NO SIGNIFICANT HISTOPATHOLOGY.     NO HISTOLOGIC EVIDENCE OF CELIAC SPRUE OR INFECTIOUS MICROORGANISMS.  B. STOMACH, ANTRUM (BIOPSY):     GASTRIC MUCOSA WITH NO SIGNIFICANT HISTOPATHOLOGY.     NO HELICOBACTER PYLORI MICROORGANISMS ARE IDENTIFIED WITH A SPECIAL STAIN.  C. ESOPHAGOGASTRIC JUNCTION (BIOPSY):     SQUAMOUS EPITHELIUM WITH NO SIGNIFICANT HISTOPATHOLOGY.  NO GLANDULAR EPITHELIUM SEEN.  AN ALCIAN BLUE/PAS STAIN IS NEGATIVE FOR BOTH INTESTINAL METAPLASIA AND FUNGAL ELEMENTS.  D. TERMINAL ILEUM (BIOPSY):     SMALL BOWEL MUCOSA WITH NO SIGNIFICANT HISTOPATHOLOGY.     NO HISTOLOGIC EVIDENCE OF ACTIVE OR CHRONIC ILEITIS.  NO GRANULOMATA IDENTIFIED.  E. CECUM (BIOPSY):     CHRONIC INACTIVE COLITIS.  NO GRANULOMATA OR DYSPLASIA IDENTIFIED.  SEE COMMENT.  F. COLON, ASCENDING (BIOPSY):     CHRONIC INACTIVE COLITIS.  NO GRANULOMATA OR DYSPLASIA IDENTIFIED.      G. COLON, TRANSVERSE (BIOPSY):     CHRONIC MINIMALLY ACTIVE COLITIS.  NO GRANULOMATA OR DYSPLASIA IDENTIFIED.      H. COLON, DESCENDING (BIOPSY):     CHRONIC MINIMALLY ACTIVE COLITIS.  NO GRANULOMATA OR DYSPLASIA IDENTIFIED.      I. COLON, SIGMOID (BIOPSY):     CHRONIC ACTIVE COLITIS.  NO GRANULOMATA OR DYSPLASIA IDENTIFIED.  J. Rectum CHRONIC ACTIVE PROCTITIS.  NO GRANULOMATA OR DYSPLASIA IDENTIFIED.

## 2021-12-16 ENCOUNTER — OFFICE VISIT (OUTPATIENT)
Dept: URBAN - METROPOLITAN AREA CLINIC 79 | Facility: CLINIC | Age: 46
End: 2021-12-16
Payer: COMMERCIAL

## 2021-12-16 VITALS
TEMPERATURE: 97.7 F | RESPIRATION RATE: 18 BRPM | HEIGHT: 64 IN | BODY MASS INDEX: 42 KG/M2 | DIASTOLIC BLOOD PRESSURE: 67 MMHG | SYSTOLIC BLOOD PRESSURE: 121 MMHG | WEIGHT: 246 LBS | HEART RATE: 91 BPM

## 2021-12-16 DIAGNOSIS — K51.80 CHRONIC PANCOLONIC ULCERATIVE COLITIS: ICD-10-CM

## 2021-12-16 PROCEDURE — 96413 CHEMO IV INFUSION 1 HR: CPT | Performed by: INTERNAL MEDICINE

## 2021-12-16 RX ORDER — USTEKINUMAB 90 MG/ML
AS DIRECTED INJECTION, SOLUTION SUBCUTANEOUS
Qty: 1 | Refills: 11 | Status: ACTIVE | COMMUNITY
End: 2023-09-12

## 2021-12-16 RX ORDER — PANTOPRAZOLE SODIUM 40 MG/1
1 TABLET TABLET, DELAYED RELEASE ORAL BID
Qty: 60 TABLET | Refills: 11 | Status: ACTIVE | COMMUNITY

## 2021-12-16 RX ORDER — DIPHENOXYLATE HYDROCHLORIDE AND ATROPINE SULFATE 2.5; .025 MG/1; MG/1
1 TABLET AS NEEDED TABLET ORAL
Qty: 120 | Refills: 3 | Status: ACTIVE | COMMUNITY
Start: 2020-09-30

## 2021-12-16 RX ORDER — DICYCLOMINE HYDROCHLORIDE 20 MG/1
TAKE 1 TABLET BY MOUTH 3 TIMES A DAY TABLET ORAL
Qty: 270 | Refills: 1 | Status: ACTIVE | COMMUNITY

## 2021-12-16 RX ORDER — DIPHENOXYLATE HYDROCHLORIDE AND ATROPINE SULFATE 2.5; .025 MG/1; MG/1
1 TABLET AS NEEDED TABLET ORAL
Qty: 120 | Refills: 3 | Status: ACTIVE | COMMUNITY
Start: 2021-12-07

## 2021-12-16 RX ORDER — USTEKINUMAB 90 MG/ML
AS DIRECTED INJECTION, SOLUTION SUBCUTANEOUS
Qty: 1 | Refills: 11 | Status: ACTIVE | COMMUNITY
Start: 2021-12-07 | End: 2023-10-10

## 2021-12-16 RX ORDER — ONDANSETRON HYDROCHLORIDE 4 MG/1
1 TABLET TABLET, FILM COATED ORAL
Qty: 60 | Refills: 2 | Status: ACTIVE | COMMUNITY

## 2022-01-06 ENCOUNTER — TELEPHONE ENCOUNTER (OUTPATIENT)
Dept: URBAN - METROPOLITAN AREA CLINIC 92 | Facility: CLINIC | Age: 47
End: 2022-01-06

## 2022-01-06 RX ORDER — DICYCLOMINE HYDROCHLORIDE 20 MG/1
1 TABLET TABLET ORAL
Qty: 90 | Refills: 1

## 2022-01-14 ENCOUNTER — OFFICE VISIT (OUTPATIENT)
Dept: URBAN - METROPOLITAN AREA CLINIC 79 | Facility: CLINIC | Age: 47
End: 2022-01-14

## 2022-01-21 ENCOUNTER — OFFICE VISIT (OUTPATIENT)
Dept: URBAN - METROPOLITAN AREA CLINIC 79 | Facility: CLINIC | Age: 47
End: 2022-01-21
Payer: COMMERCIAL

## 2022-01-21 VITALS
HEART RATE: 88 BPM | DIASTOLIC BLOOD PRESSURE: 71 MMHG | TEMPERATURE: 97.7 F | BODY MASS INDEX: 42.17 KG/M2 | SYSTOLIC BLOOD PRESSURE: 126 MMHG | WEIGHT: 247 LBS | HEIGHT: 64 IN | RESPIRATION RATE: 18 BRPM

## 2022-01-21 DIAGNOSIS — D50.8 ACQUIRED IRON DEFICIENCY ANEMIA DUE TO DECREASED ABSORPTION: ICD-10-CM

## 2022-01-21 PROCEDURE — 96365 THER/PROPH/DIAG IV INF INIT: CPT | Performed by: INTERNAL MEDICINE

## 2022-01-21 RX ORDER — DIPHENOXYLATE HYDROCHLORIDE AND ATROPINE SULFATE 2.5; .025 MG/1; MG/1
1 TABLET AS NEEDED TABLET ORAL
Qty: 120 | Refills: 3 | Status: ACTIVE | COMMUNITY
Start: 2020-09-30

## 2022-01-21 RX ORDER — PANTOPRAZOLE SODIUM 40 MG/1
1 TABLET TABLET, DELAYED RELEASE ORAL BID
Qty: 60 TABLET | Refills: 11 | Status: ACTIVE | COMMUNITY

## 2022-01-21 RX ORDER — USTEKINUMAB 90 MG/ML
AS DIRECTED INJECTION, SOLUTION SUBCUTANEOUS
Qty: 1 | Refills: 11 | Status: ACTIVE | COMMUNITY
Start: 2021-12-07 | End: 2023-10-10

## 2022-01-21 RX ORDER — ONDANSETRON HYDROCHLORIDE 4 MG/1
1 TABLET TABLET, FILM COATED ORAL
Qty: 60 | Refills: 2 | Status: ACTIVE | COMMUNITY

## 2022-01-21 RX ORDER — USTEKINUMAB 90 MG/ML
AS DIRECTED INJECTION, SOLUTION SUBCUTANEOUS
Qty: 1 | Refills: 11 | Status: ACTIVE | COMMUNITY
End: 2023-09-12

## 2022-01-21 RX ORDER — DIPHENOXYLATE HYDROCHLORIDE AND ATROPINE SULFATE 2.5; .025 MG/1; MG/1
1 TABLET AS NEEDED TABLET ORAL
Qty: 120 | Refills: 3 | Status: ACTIVE | COMMUNITY
Start: 2021-12-07

## 2022-01-21 RX ORDER — DICYCLOMINE HYDROCHLORIDE 20 MG/1
1 TABLET TABLET ORAL
Qty: 90 | Refills: 1 | Status: ACTIVE | COMMUNITY

## 2022-01-28 ENCOUNTER — OFFICE VISIT (OUTPATIENT)
Dept: URBAN - METROPOLITAN AREA CLINIC 79 | Facility: CLINIC | Age: 47
End: 2022-01-28
Payer: COMMERCIAL

## 2022-01-28 VITALS
HEIGHT: 64 IN | RESPIRATION RATE: 18 BRPM | TEMPERATURE: 98.1 F | WEIGHT: 247 LBS | DIASTOLIC BLOOD PRESSURE: 60 MMHG | HEART RATE: 83 BPM | SYSTOLIC BLOOD PRESSURE: 103 MMHG | BODY MASS INDEX: 42.17 KG/M2

## 2022-01-28 DIAGNOSIS — D50.8 ACQUIRED IRON DEFICIENCY ANEMIA DUE TO DECREASED ABSORPTION: ICD-10-CM

## 2022-01-28 PROCEDURE — 96365 THER/PROPH/DIAG IV INF INIT: CPT | Performed by: INTERNAL MEDICINE

## 2022-01-28 RX ORDER — DIPHENOXYLATE HYDROCHLORIDE AND ATROPINE SULFATE 2.5; .025 MG/1; MG/1
1 TABLET AS NEEDED TABLET ORAL
Qty: 120 | Refills: 3 | Status: ACTIVE | COMMUNITY
Start: 2020-09-30

## 2022-01-28 RX ORDER — USTEKINUMAB 90 MG/ML
AS DIRECTED INJECTION, SOLUTION SUBCUTANEOUS
Qty: 1 | Refills: 11 | Status: ACTIVE | COMMUNITY
Start: 2021-12-07 | End: 2023-10-10

## 2022-01-28 RX ORDER — DIPHENOXYLATE HYDROCHLORIDE AND ATROPINE SULFATE 2.5; .025 MG/1; MG/1
1 TABLET AS NEEDED TABLET ORAL
Qty: 120 | Refills: 3 | Status: ACTIVE | COMMUNITY
Start: 2021-12-07

## 2022-01-28 RX ORDER — USTEKINUMAB 90 MG/ML
AS DIRECTED INJECTION, SOLUTION SUBCUTANEOUS
Qty: 1 | Refills: 11 | Status: ACTIVE | COMMUNITY
End: 2023-09-12

## 2022-01-28 RX ORDER — ONDANSETRON HYDROCHLORIDE 4 MG/1
1 TABLET TABLET, FILM COATED ORAL
Qty: 60 | Refills: 2 | Status: ACTIVE | COMMUNITY

## 2022-01-28 RX ORDER — PANTOPRAZOLE SODIUM 40 MG/1
1 TABLET TABLET, DELAYED RELEASE ORAL BID
Qty: 60 TABLET | Refills: 11 | Status: ACTIVE | COMMUNITY

## 2022-01-28 RX ORDER — DICYCLOMINE HYDROCHLORIDE 20 MG/1
1 TABLET TABLET ORAL
Qty: 90 | Refills: 1 | Status: ACTIVE | COMMUNITY

## 2022-02-01 ENCOUNTER — OFFICE VISIT (OUTPATIENT)
Dept: URBAN - METROPOLITAN AREA CLINIC 96 | Facility: CLINIC | Age: 47
End: 2022-02-01
Payer: COMMERCIAL

## 2022-02-01 ENCOUNTER — LAB OUTSIDE AN ENCOUNTER (OUTPATIENT)
Dept: URBAN - METROPOLITAN AREA CLINIC 96 | Facility: CLINIC | Age: 47
End: 2022-02-01

## 2022-02-01 DIAGNOSIS — R79.82 CRP ELEVATED: ICD-10-CM

## 2022-02-01 DIAGNOSIS — Z09 FOLLOW UP: ICD-10-CM

## 2022-02-01 DIAGNOSIS — R19.7 DIARRHEA: ICD-10-CM

## 2022-02-01 DIAGNOSIS — Z91.89 COLON CANCER HIGH RISK: ICD-10-CM

## 2022-02-01 DIAGNOSIS — M25.50 ARTHRALGIA: ICD-10-CM

## 2022-02-01 DIAGNOSIS — K92.1 HEMATOCHEZIA: ICD-10-CM

## 2022-02-01 DIAGNOSIS — K51.00 UNIVERSAL ULCERATIVE COLITIS WITHOUT COMPLICATION: ICD-10-CM

## 2022-02-01 DIAGNOSIS — D50.9 IRON (FE) DEFICIENCY ANEMIA: ICD-10-CM

## 2022-02-01 DIAGNOSIS — K21.9 GERD (GASTROESOPHAGEAL REFLUX DISEASE): ICD-10-CM

## 2022-02-01 DIAGNOSIS — R10.13 DYSPEPSIA: ICD-10-CM

## 2022-02-01 PROCEDURE — 99215 OFFICE O/P EST HI 40 MIN: CPT | Performed by: INTERNAL MEDICINE

## 2022-02-01 RX ORDER — DIPHENOXYLATE HYDROCHLORIDE AND ATROPINE SULFATE 2.5; .025 MG/1; MG/1
1 TABLET AS NEEDED TABLET ORAL
Qty: 120 | Refills: 3 | COMMUNITY
Start: 2020-09-30

## 2022-02-01 RX ORDER — USTEKINUMAB 90 MG/ML
AS DIRECTED INJECTION, SOLUTION SUBCUTANEOUS
Qty: 1 | Refills: 11 | OUTPATIENT

## 2022-02-01 RX ORDER — DICYCLOMINE HYDROCHLORIDE 20 MG/1
1 TABLET TABLET ORAL
Qty: 90 | Refills: 1 | COMMUNITY

## 2022-02-01 RX ORDER — ONDANSETRON HYDROCHLORIDE 4 MG/1
1 TABLET TABLET, FILM COATED ORAL
Qty: 60 | Refills: 2 | OUTPATIENT

## 2022-02-01 RX ORDER — DIPHENOXYLATE HYDROCHLORIDE AND ATROPINE SULFATE 2.5; .025 MG/1; MG/1
1 TABLET AS NEEDED TABLET ORAL
Qty: 120 | Refills: 3 | OUTPATIENT

## 2022-02-01 RX ORDER — DIPHENOXYLATE HYDROCHLORIDE AND ATROPINE SULFATE 2.5; .025 MG/1; MG/1
1 TABLET AS NEEDED TABLET ORAL
Qty: 120 | Refills: 3 | COMMUNITY
Start: 2021-12-07

## 2022-02-01 RX ORDER — PANTOPRAZOLE SODIUM 40 MG/1
1 TABLET TABLET, DELAYED RELEASE ORAL BID
Qty: 60 TABLET | Refills: 11 | COMMUNITY

## 2022-02-01 RX ORDER — USTEKINUMAB 90 MG/ML
AS DIRECTED INJECTION, SOLUTION SUBCUTANEOUS
Qty: 1 | Refills: 11 | COMMUNITY
End: 2023-09-12

## 2022-02-01 RX ORDER — ONDANSETRON HYDROCHLORIDE 4 MG/1
1 TABLET TABLET, FILM COATED ORAL
Qty: 60 | Refills: 2 | COMMUNITY

## 2022-02-01 RX ORDER — USTEKINUMAB 90 MG/ML
AS DIRECTED INJECTION, SOLUTION SUBCUTANEOUS
Qty: 1 | Refills: 11 | COMMUNITY
Start: 2021-12-07 | End: 2023-10-10

## 2022-02-01 RX ORDER — POLYETHYLENE GLYCOL 3350, SODIUM SULFATE, SODIUM CHLORIDE, POTASSIUM CHLORIDE, ASCORBIC ACID, SODIUM ASCORBATE 140-9-5.2G
1 KIT KIT ORAL ONCE
Qty: 1 | Refills: 1 | OUTPATIENT
Start: 2022-02-01 | End: 2022-02-03

## 2022-02-01 RX ORDER — PANTOPRAZOLE SODIUM 40 MG/1
1 TABLET TABLET, DELAYED RELEASE ORAL BID
Qty: 60 TABLET | Refills: 11 | OUTPATIENT

## 2022-02-01 NOTE — HPI-TODAY'S VISIT:
Pt Sheree is a 47 y/o individual with pan-UC, currently on Stelara (started on 1/2021) and previously xeljanz (10 BID started 4/2020) and previously Imuran (100mg), previously on Remicade (started 1/2020) and previously on Entyvio (every 4 weeks) and additional 2 week dose of entyvio, here for refractory disease. . Pt is referred by Dr. Hebert. . She was diagnosed with UC at age 39.  She initially had proctitis, and was started on Lialda.  She did not get better, so started on Remicade.  Remicade worked, but developed muscle spasms on it (for 6 months of therapy).  Was not on therapy for about 1 year, was then hospitalized and found to have pan-UC.  Humira for 3 months then stopped.  She then started on Entyvio (2017).  It helped somewhat.  She has not taken steroids in a while.  She then went on Entyvio and then went to xeljanz (started 4/2020 and stopped 12/2020). . Previously on 9/18/2019, she reports that she has 7-10 BM with diarrhea or semi-solid.  Always blood in the stool and tissue.  She has some abdominal pain and cramping, really worse with defecation.  No weight loss.   . Previously on 10/23/2019, pt reports that after starting sulfasalazine, she noticed increased abdominal pain and cramping and worsening diarrhea.  After stopping, her sxs went back to normal.  Having rectal bleeding as well.  Over 10 diarrheal BM per day.  Joint pain is worse, yeimi after infusion (was improved with sulfasalazine). . Previously on 1/8/2020, pt reports that she continues to have 7 BM per day, diarrhea.  She got a 2 week booster dose of entyvio 300mg, which helped her for that day, however, the following day, she is back to her usual flare.  Unable to tolerate sulfasalazine or balsalzide.  Sometimes she has crippling joint pain, perhaps related to entyvio.  Lot of anxiety. . Previously on 2/17/2020, pt reports that she had a reaction to Remicade infusion, 9 days post infusion.  She had severe joint pain, muscle pain, headaches.  She was hospitalized due to severity of sxs.  Joint pain has resolved since stopping entyvio.  Diarrhea has improved after dose of Remicade.  S/p 2 iron infusions.  . Previously on 4/2/2020, pt reports that she got denied by insurance for stelara after multiple appeals.  Over 10 bloody BM per day.  Not on steroids any longer due to COVID concerns. Rectal urgency present.                      .       Previously on 5/4/2020, pt reports that she started on xeljanz about 2 weeks ago, reports that her diarrhea is worse, up to 10BM. . Previously on 5/18/2020, pt reports that she was hospitalized for nearly 1 week for IV steroids and dehydration at [facility].  She was doing well for the past week at home, but then over the weekend, she had recurrence of diarrhea and abdominal pain.  She is on prednisone 40mg. . Previously on 9/30/2020, pt reports that she continues to have some blood with tissue mucus like materials.  Having 5-6 BM per day. She has gained 25lbs of weight since we last seen her.  Is not on steroids. . Previously on 5/4/2021, pt reports that she is better on Stelara, but still has blood, tissue, 5 BM per day.  She is 50percent better, but not completely.  She has restricted her diet. . Previously on 7/6/2021, pt reports that her BM are increased up to 20 per day.  Blood in the stool and mucus. . Previously on 12/7/2021, pt reports that she got the booster dose of Stelara in August but then has not get the injection due to insurance denial.  We gave her a sample of the Stelara, which IMPROVED THE DIARRHEA and developed constipation.  However, b/c of delay, now bleeding again. . Today on 2/1/2022, pt reports that she has rectal bleeding, she has 4-5 BM per day, still having urgency.  Taking Lomotil/bentyl around the clock when working.  Joint pain improves with Medrol pack. . 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 Labs: 5/2021: stelara 0.6 (no Ab) . 1/2020: IFX AB neg 10/2019: CRP 37, Vedo 15, Stool culture neg and c diff neg 9/2019: Hgb 9.8, ast 8, alt 6,  1/2019: Hgb 9.7, ast 14, alt 8, Crp 21.2, 6-TGN 85, 6-MMP 1518, Esr 14, ferritin 15, Iron sat 5 . 1/2020: The ileum appeared normal. The ascending colon and cecum appeared normal.  Biopsies were taken with a cold forceps for histology.  The pathology specimen was placed into Bottle Number 1. Vega 0. The transverse colon appeared normal.  Biopsies were taken with a cold forceps for histology.  Vega 0.  The pathology specimen was placed into Bottle Number 2. A patchy area of mildly erythematous mucosa was found in the descending colon and at the splenic flexure.  Vega 1. This was biopsied with a cold forceps for histology.  The pathology specimen was placed into Bottle Number 3. Diffuse moderate inflammation characterized by erosions and erythema was found in the rectum, in the recto-sigmoid colon and in the sigmoid colon.  Biopsies were taken with a cold forceps for histology.  The pathology specimen was placed into Bottle Number 4. Vega Score 2. . A. Right Colon , Biopsy: -Colonic mucosa with no diagnostic abnormality.  -No evidence of chronic or active colitis.  -No granulomas seen.  -Negative for dysplasia or malignancy. B. Transverse Colon , Biopsy: -Architectural and inflammatory changes consistent with primary inflammatory bowel disease with patchy moderate activity. -Negative for viral inclusions, dysplasia or malignancy. C. Left Colon , Biopsy: -Architectural and inflammatory changes consistent with primary inflammatory bowel disease with diffuse moderate activity. -Negative for viral inclusions, dysplasia or malignancy. D. Recto-Sigmoid, Colon , Biopsy: -Architectural and inflammatory changes consistent with primary inflammatory bowel disease with diffuse moderate activity. -Negative for viral inclusions, dysplasia or malignancy. . 2/2018: A. DUODENUM (BIOPSY):     SMALL BOWEL MUCOSA WITH NO SIGNIFICANT HISTOPATHOLOGY.     NO HISTOLOGIC EVIDENCE OF CELIAC SPRUE OR INFECTIOUS MICROORGANISMS.  B. STOMACH, ANTRUM (BIOPSY):     GASTRIC MUCOSA WITH NO SIGNIFICANT HISTOPATHOLOGY.     NO HELICOBACTER PYLORI MICROORGANISMS ARE IDENTIFIED WITH A SPECIAL STAIN.  C. ESOPHAGOGASTRIC JUNCTION (BIOPSY):     SQUAMOUS EPITHELIUM WITH NO SIGNIFICANT HISTOPATHOLOGY.  NO GLANDULAR EPITHELIUM SEEN.  AN ALCIAN BLUE/PAS STAIN IS NEGATIVE FOR BOTH INTESTINAL METAPLASIA AND FUNGAL ELEMENTS.  D. TERMINAL ILEUM (BIOPSY):     SMALL BOWEL MUCOSA WITH NO SIGNIFICANT HISTOPATHOLOGY.     NO HISTOLOGIC EVIDENCE OF ACTIVE OR CHRONIC ILEITIS.  NO GRANULOMATA IDENTIFIED.  E. CECUM (BIOPSY):     CHRONIC INACTIVE COLITIS.  NO GRANULOMATA OR DYSPLASIA IDENTIFIED.  SEE COMMENT.  F. COLON, ASCENDING (BIOPSY):     CHRONIC INACTIVE COLITIS.  NO GRANULOMATA OR DYSPLASIA IDENTIFIED.      G. COLON, TRANSVERSE (BIOPSY):     CHRONIC MINIMALLY ACTIVE COLITIS.  NO GRANULOMATA OR DYSPLASIA IDENTIFIED.      H. COLON, DESCENDING (BIOPSY):     CHRONIC MINIMALLY ACTIVE COLITIS.  NO GRANULOMATA OR DYSPLASIA IDENTIFIED.      I. COLON, SIGMOID (BIOPSY):     CHRONIC ACTIVE COLITIS.  NO GRANULOMATA OR DYSPLASIA IDENTIFIED.  J. Rectum CHRONIC ACTIVE PROCTITIS.  NO GRANULOMATA OR DYSPLASIA IDENTIFIED.

## 2022-02-02 LAB
A/G RATIO: 1.3
ALBUMIN: 4.4
ALKALINE PHOSPHATASE: 67
ALT (SGPT): 9
AST (SGOT): 9
BASO (ABSOLUTE): 0.4
BASOS: 3
BILIRUBIN, TOTAL: <0.2
BUN/CREATININE RATIO: 16
BUN: 11
C-REACTIVE PROTEIN, QUANT: 25
CALCIUM: 9.2
CARBON DIOXIDE, TOTAL: 20
CHLORIDE: 103
CREATININE: 0.67
EGFR IF AFRICN AM: 121
EGFR IF NONAFRICN AM: 105
EOS (ABSOLUTE): 0.5
EOS: 4
FERRITIN, SERUM: 848
GLOBULIN, TOTAL: 3.3
GLUCOSE: 76
HEMATOCRIT: 33.2
HEMATOLOGY COMMENTS:: (no result)
HEMOGLOBIN: 9.8
IMMATURE CELLS: (no result)
IMMATURE GRANS (ABS): (no result)
IMMATURE GRANULOCYTES: (no result)
IRON BIND.CAP.(TIBC): 389
IRON SATURATION: 14
IRON: 54
LYMPHS (ABSOLUTE): 1.9
LYMPHS: 14
MCH: 22.1
MCHC: 29.5
MCV: 75
MONOCYTES(ABSOLUTE): 0.5
MONOCYTES: 4
NEUTROPHILS (ABSOLUTE): 10
NEUTROPHILS: 75
NRBC: (no result)
PLATELETS: 405
POTASSIUM: 5
PROTEIN, TOTAL: 7.7
RBC: 4.44
RDW: 24.4
SODIUM: 138
UIBC: 335
VITAMIN B12: 325
VITAMIN D, 25-HYDROXY: 20.9
WBC: 13.3

## 2022-02-11 ENCOUNTER — TELEPHONE ENCOUNTER (OUTPATIENT)
Dept: URBAN - METROPOLITAN AREA CLINIC 92 | Facility: CLINIC | Age: 47
End: 2022-02-11

## 2022-02-23 LAB — C DIFFICILE TOXIN GENE NAA: NEGATIVE

## 2022-03-17 ENCOUNTER — CLAIMS CREATED FROM THE CLAIM WINDOW (OUTPATIENT)
Dept: URBAN - METROPOLITAN AREA CLINIC 4 | Facility: CLINIC | Age: 47
End: 2022-03-17
Payer: COMMERCIAL

## 2022-03-17 ENCOUNTER — TELEPHONE ENCOUNTER (OUTPATIENT)
Dept: URBAN - METROPOLITAN AREA CLINIC 92 | Facility: CLINIC | Age: 47
End: 2022-03-17

## 2022-03-17 ENCOUNTER — OFFICE VISIT (OUTPATIENT)
Dept: URBAN - METROPOLITAN AREA SURGERY CENTER 18 | Facility: SURGERY CENTER | Age: 47
End: 2022-03-17
Payer: COMMERCIAL

## 2022-03-17 DIAGNOSIS — K51.919 ULCERATIVE COLITIS, UNSPECIFIED WITH UNSPECIFIED COMPLICATIONS: ICD-10-CM

## 2022-03-17 DIAGNOSIS — K31.89 FOCAL FOVEOLAR HYPERPLASIA: ICD-10-CM

## 2022-03-17 DIAGNOSIS — K31.89 ACQUIRED DEFORMITY OF DUODENUM: ICD-10-CM

## 2022-03-17 DIAGNOSIS — K51.00 ACUTE ULCERATIVE PANCOLITIS: ICD-10-CM

## 2022-03-17 DIAGNOSIS — R12 BURNING REFLUX: ICD-10-CM

## 2022-03-17 DIAGNOSIS — K63.89 CYST OF DUODENUM: ICD-10-CM

## 2022-03-17 PROCEDURE — G8907 PT DOC NO EVENTS ON DISCHARG: HCPCS | Performed by: INTERNAL MEDICINE

## 2022-03-17 PROCEDURE — 88305 TISSUE EXAM BY PATHOLOGIST: CPT | Performed by: PATHOLOGY

## 2022-03-17 PROCEDURE — 88312 SPECIAL STAINS GROUP 1: CPT | Performed by: PATHOLOGY

## 2022-03-17 PROCEDURE — 45380 COLONOSCOPY AND BIOPSY: CPT | Performed by: INTERNAL MEDICINE

## 2022-03-17 PROCEDURE — 43239 EGD BIOPSY SINGLE/MULTIPLE: CPT | Performed by: INTERNAL MEDICINE

## 2022-03-17 RX ORDER — PANTOPRAZOLE SODIUM 40 MG/1
1 TABLET TABLET, DELAYED RELEASE ORAL BID
Qty: 60 TABLET | Refills: 11 | Status: ACTIVE | COMMUNITY

## 2022-03-17 RX ORDER — USTEKINUMAB 90 MG/ML
AS DIRECTED INJECTION, SOLUTION SUBCUTANEOUS
Qty: 1 | Refills: 11 | Status: ACTIVE | COMMUNITY

## 2022-03-17 RX ORDER — DICYCLOMINE HYDROCHLORIDE 20 MG/1
1 TABLET TABLET ORAL
Qty: 90 | Refills: 1 | COMMUNITY

## 2022-03-17 RX ORDER — USTEKINUMAB 90 MG/ML
AS DIRECTED INJECTION, SOLUTION SUBCUTANEOUS
Qty: 1 | Refills: 11 | COMMUNITY
End: 2023-09-12

## 2022-03-17 RX ORDER — DIPHENOXYLATE HYDROCHLORIDE AND ATROPINE SULFATE 2.5; .025 MG/1; MG/1
1 TABLET AS NEEDED TABLET ORAL
Qty: 120 | Refills: 3 | COMMUNITY
Start: 2020-09-30

## 2022-03-17 RX ORDER — ONDANSETRON HYDROCHLORIDE 4 MG/1
1 TABLET TABLET, FILM COATED ORAL
Qty: 60 | Refills: 2 | Status: ACTIVE | COMMUNITY

## 2022-03-17 RX ORDER — OZANIMOD HYDROCHLORIDE 0.23-0.46
1 TAB KIT ORAL DAILY
Qty: 7 TABLET | Refills: 0 | OUTPATIENT

## 2022-03-17 RX ORDER — DIPHENOXYLATE HYDROCHLORIDE AND ATROPINE SULFATE 2.5; .025 MG/1; MG/1
1 TABLET AS NEEDED TABLET ORAL
Qty: 120 | Refills: 3 | Status: ACTIVE | COMMUNITY

## 2022-03-17 RX ORDER — OZANIMOD HYDROCHLORIDE 0.92 MG/1
1 CAPSULE CAPSULE ORAL ONCE A DAY
Qty: 30 | Refills: 11 | OUTPATIENT
Start: 2022-03-17 | End: 2023-03-11

## 2022-03-23 ENCOUNTER — WEB ENCOUNTER (OUTPATIENT)
Dept: URBAN - METROPOLITAN AREA CLINIC 96 | Facility: CLINIC | Age: 47
End: 2022-03-23

## 2022-03-23 RX ORDER — DIPHENOXYLATE HYDROCHLORIDE AND ATROPINE SULFATE 2.5; .025 MG/1; MG/1
1 TABLET AS NEEDED TABLET ORAL
Qty: 120 | Refills: 3 | OUTPATIENT
Start: 2022-03-24

## 2022-03-23 RX ORDER — CIPROFLOXACIN HYDROCHLORIDE 500 MG/1
1 TABLET TABLET, FILM COATED ORAL
Qty: 28 TABLET | Refills: 0 | OUTPATIENT
Start: 2022-03-24 | End: 2022-04-07

## 2022-03-23 RX ORDER — METRONIDAZOLE 500 MG/1
1 TABLET TABLET, FILM COATED ORAL
Qty: 28 TABLET | Refills: 0 | OUTPATIENT
Start: 2022-03-24 | End: 2022-04-07

## 2022-03-23 RX ORDER — HYOSCYAMINE SULFATE 0.12 MG/1
1 TABLET AS NEEDED TABLET ORAL
Qty: 120 | Refills: 3 | OUTPATIENT
Start: 2022-03-24 | End: 2022-07-22

## 2022-03-25 ENCOUNTER — WEB ENCOUNTER (OUTPATIENT)
Dept: URBAN - METROPOLITAN AREA CLINIC 96 | Facility: CLINIC | Age: 47
End: 2022-03-25

## 2022-03-29 ENCOUNTER — WEB ENCOUNTER (OUTPATIENT)
Dept: URBAN - METROPOLITAN AREA CLINIC 96 | Facility: CLINIC | Age: 47
End: 2022-03-29

## 2022-04-06 ENCOUNTER — WEB ENCOUNTER (OUTPATIENT)
Dept: URBAN - METROPOLITAN AREA CLINIC 96 | Facility: CLINIC | Age: 47
End: 2022-04-06

## 2022-04-06 RX ORDER — OZANIMOD HYDROCHLORIDE 0.92 MG/1
1 CAPSULE CAPSULE ORAL ONCE A DAY
Qty: 30 | Refills: 11
Start: 2022-03-17 | End: 2023-04-01

## 2022-04-06 RX ORDER — OZANIMOD HYDROCHLORIDE 0.23-0.46
1 TAB KIT ORAL DAILY
Qty: 7 TABLET | Refills: 0
End: 2022-04-13

## 2022-04-11 ENCOUNTER — TELEPHONE ENCOUNTER (OUTPATIENT)
Dept: URBAN - METROPOLITAN AREA CLINIC 92 | Facility: CLINIC | Age: 47
End: 2022-04-11

## 2022-04-19 ENCOUNTER — TELEPHONE ENCOUNTER (OUTPATIENT)
Dept: URBAN - METROPOLITAN AREA CLINIC 92 | Facility: CLINIC | Age: 47
End: 2022-04-19

## 2022-04-28 ENCOUNTER — WEB ENCOUNTER (OUTPATIENT)
Dept: URBAN - METROPOLITAN AREA CLINIC 96 | Facility: CLINIC | Age: 47
End: 2022-04-28

## 2022-04-28 RX ORDER — DICYCLOMINE HYDROCHLORIDE 20 MG/1
1 TABLET TABLET ORAL
Qty: 90 | Refills: 1
End: 2022-07-07

## 2022-05-03 ENCOUNTER — TELEPHONE ENCOUNTER (OUTPATIENT)
Dept: URBAN - METROPOLITAN AREA CLINIC 92 | Facility: CLINIC | Age: 47
End: 2022-05-03

## 2022-05-05 ENCOUNTER — LAB OUTSIDE AN ENCOUNTER (OUTPATIENT)
Dept: URBAN - METROPOLITAN AREA CLINIC 96 | Facility: CLINIC | Age: 47
End: 2022-05-05

## 2022-05-09 LAB — C DIFFICILE TOXIN GENE NAA: NEGATIVE

## 2022-06-06 ENCOUNTER — TELEPHONE ENCOUNTER (OUTPATIENT)
Dept: URBAN - METROPOLITAN AREA CLINIC 96 | Facility: CLINIC | Age: 47
End: 2022-06-06

## 2022-06-06 RX ORDER — DICYCLOMINE HYDROCHLORIDE 20 MG/1
1 TABLET TABLET ORAL
Qty: 90 | Refills: 1
End: 2022-08-05

## 2022-06-30 ENCOUNTER — TELEPHONE ENCOUNTER (OUTPATIENT)
Dept: URBAN - METROPOLITAN AREA CLINIC 92 | Facility: CLINIC | Age: 47
End: 2022-06-30

## 2022-06-30 RX ORDER — DICYCLOMINE HYDROCHLORIDE 20 MG/1
1 TABLET TABLET ORAL
Qty: 90 | Refills: 1
End: 2022-09-01

## 2022-09-26 ENCOUNTER — TELEPHONE ENCOUNTER (OUTPATIENT)
Dept: URBAN - METROPOLITAN AREA CLINIC 92 | Facility: CLINIC | Age: 47
End: 2022-09-26

## 2022-10-24 ENCOUNTER — TELEPHONE ENCOUNTER (OUTPATIENT)
Dept: URBAN - METROPOLITAN AREA CLINIC 92 | Facility: CLINIC | Age: 47
End: 2022-10-24

## 2022-10-24 RX ORDER — DIPHENOXYLATE HYDROCHLORIDE AND ATROPINE SULFATE 2.5; .025 MG/1; MG/1
1 TABLET AS NEEDED TABLET ORAL
Qty: 120 | Refills: 3
Start: 2022-03-24

## 2022-10-24 RX ORDER — PANTOPRAZOLE SODIUM 40 MG/1
1 TABLET TABLET, DELAYED RELEASE ORAL BID
Qty: 60 TABLET | Refills: 11

## 2022-11-01 ENCOUNTER — OUT OF OFFICE VISIT (OUTPATIENT)
Dept: URBAN - METROPOLITAN AREA MEDICAL CENTER 18 | Facility: MEDICAL CENTER | Age: 47
End: 2022-11-01
Payer: COMMERCIAL

## 2022-11-01 DIAGNOSIS — K51.818 OTHER ULCERATIVE COLITIS WITH OTHER COMPLICATION: ICD-10-CM

## 2022-11-01 PROCEDURE — G8427 DOCREV CUR MEDS BY ELIG CLIN: HCPCS | Performed by: INTERNAL MEDICINE

## 2022-11-01 PROCEDURE — 99222 1ST HOSP IP/OBS MODERATE 55: CPT | Performed by: INTERNAL MEDICINE

## 2022-11-01 PROCEDURE — 99232 SBSQ HOSP IP/OBS MODERATE 35: CPT | Performed by: INTERNAL MEDICINE

## 2022-11-02 ENCOUNTER — WEB ENCOUNTER (OUTPATIENT)
Dept: URBAN - METROPOLITAN AREA CLINIC 96 | Facility: CLINIC | Age: 47
End: 2022-11-02

## 2022-11-03 ENCOUNTER — P2P PATIENT RECORD (OUTPATIENT)
Age: 47
End: 2022-11-03

## 2022-11-03 ENCOUNTER — WEB ENCOUNTER (OUTPATIENT)
Dept: URBAN - METROPOLITAN AREA CLINIC 96 | Facility: CLINIC | Age: 47
End: 2022-11-03

## 2022-11-03 RX ORDER — PANTOPRAZOLE SODIUM 40 MG/1
1 TABLET TABLET, DELAYED RELEASE ORAL BID
Qty: 60 TABLET | Refills: 11

## 2022-11-04 ENCOUNTER — WEB ENCOUNTER (OUTPATIENT)
Dept: URBAN - METROPOLITAN AREA CLINIC 96 | Facility: CLINIC | Age: 47
End: 2022-11-04

## 2022-11-29 ENCOUNTER — OFFICE VISIT (OUTPATIENT)
Dept: URBAN - METROPOLITAN AREA CLINIC 96 | Facility: CLINIC | Age: 47
End: 2022-11-29
Payer: COMMERCIAL

## 2022-11-29 ENCOUNTER — LAB OUTSIDE AN ENCOUNTER (OUTPATIENT)
Dept: URBAN - METROPOLITAN AREA CLINIC 96 | Facility: CLINIC | Age: 47
End: 2022-11-29

## 2022-11-29 VITALS
BODY MASS INDEX: 38.76 KG/M2 | HEIGHT: 64 IN | SYSTOLIC BLOOD PRESSURE: 123 MMHG | DIASTOLIC BLOOD PRESSURE: 82 MMHG | TEMPERATURE: 97.7 F | WEIGHT: 227 LBS | RESPIRATION RATE: 18 BRPM | HEART RATE: 75 BPM

## 2022-11-29 DIAGNOSIS — K92.1 HEMATOCHEZIA: ICD-10-CM

## 2022-11-29 DIAGNOSIS — D50.9 IRON (FE) DEFICIENCY ANEMIA: ICD-10-CM

## 2022-11-29 DIAGNOSIS — R79.82 CRP ELEVATED: ICD-10-CM

## 2022-11-29 DIAGNOSIS — K21.9 GERD (GASTROESOPHAGEAL REFLUX DISEASE): ICD-10-CM

## 2022-11-29 DIAGNOSIS — M25.50 ARTHRALGIA: ICD-10-CM

## 2022-11-29 DIAGNOSIS — R10.13 DYSPEPSIA: ICD-10-CM

## 2022-11-29 DIAGNOSIS — Z91.89 COLON CANCER HIGH RISK: ICD-10-CM

## 2022-11-29 DIAGNOSIS — K51.00 UNIVERSAL ULCERATIVE COLITIS WITHOUT COMPLICATION: ICD-10-CM

## 2022-11-29 DIAGNOSIS — R19.7 DIARRHEA: ICD-10-CM

## 2022-11-29 DIAGNOSIS — Z09 FOLLOW UP: ICD-10-CM

## 2022-11-29 PROCEDURE — 99215 OFFICE O/P EST HI 40 MIN: CPT | Performed by: INTERNAL MEDICINE

## 2022-11-29 RX ORDER — PREDNISONE 10 MG/1
3 TABLET TABLET ORAL ONCE A DAY
Qty: 90 TABLET | OUTPATIENT
Start: 2022-11-29 | End: 2022-12-29

## 2022-11-29 RX ORDER — ONDANSETRON HYDROCHLORIDE 4 MG/1
1 TABLET TABLET, FILM COATED ORAL
Qty: 60 | Refills: 2 | Status: ACTIVE | COMMUNITY

## 2022-11-29 RX ORDER — PANTOPRAZOLE SODIUM 40 MG/1
1 TABLET TABLET, DELAYED RELEASE ORAL BID
Qty: 60 TABLET | Refills: 11 | Status: ACTIVE | COMMUNITY

## 2022-11-29 RX ORDER — PANTOPRAZOLE SODIUM 40 MG/1
1 TABLET TABLET, DELAYED RELEASE ORAL EVERY 12 HOURS
Qty: 60 TABLET | Refills: 11 | OUTPATIENT
Start: 2022-11-29

## 2022-11-29 RX ORDER — OZANIMOD HYDROCHLORIDE 0.92 MG/1
1 CAPSULE CAPSULE ORAL ONCE A DAY
Qty: 30 | Refills: 11 | Status: ACTIVE | COMMUNITY
Start: 2022-03-17 | End: 2023-04-01

## 2022-11-29 RX ORDER — DIPHENOXYLATE HYDROCHLORIDE AND ATROPINE SULFATE 2.5; .025 MG/1; MG/1
1 TABLET AS NEEDED TABLET ORAL
Qty: 120 | Refills: 3 | Status: ACTIVE | COMMUNITY
Start: 2022-03-24

## 2022-11-29 NOTE — HPI-TODAY'S VISIT:
Pt Sheree is a 48 y/o individual with pan-UC, currently on Zeposia (started 5/2022), Stelara (started on 1/2021 stopped 4/2022) and previously xeljanz (10 BID started 4/2020 and stopped 12/2020) and previously Imuran (100mg), previously on Remicade (started 1/2020) and previously on Entyvio (every 4 weeks) and additional 2 week dose of entyvio, here for refractory disease. . She was diagnosed with UC at age 39.  She initially had proctitis, and was started on Lialda.  She did not get better, so started on Remicade.  Remicade worked, but developed muscle spasms on it (for 6 months of therapy).  Was not on therapy for about 1 year and found to have pan-UC.  Humira for 3 months then stopped, partial response.  She then started on Entyvio (2017).  It helped somewhat.  She has not taken steroids in a while.  She then went on Entyvio and then went to xeljanz (started 4/2020 and stopped 12/2020). . Previously on 12/7/2021, pt reports that she got the booster dose of Stelara in August but then has not get the injection due to insurance denial.  We gave her a sample of the Stelara, which IMPROVED THE DIARRHEA and developed constipation.  However, b/c of delay, now bleeding again. . Previously on 2/1/2022, pt reports that she has rectal bleeding, she has 4-5 BM per day, still having urgency.  Taking Lomotil/bentyl around the clock when working. . Today on 11/29/2022, pt reports that she has daily rectal bleeding, lot of diarrhea.  Had major flare.  Lot of cramping, urgency sxs.  Had very good response to zeposia earlier, but not doing so good now. . Joint pain improves with Medrol pack. . 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 Labs: 5/2021: stelara 0.6 (no Ab) . 1/2020: IFX AB neg 10/2019: CRP 37, Vedo 15, Stool culture neg and c diff neg 9/2019: Hgb 9.8, ast 8, alt 6,  1/2019: Hgb 9.7, ast 14, alt 8, Crp 21.2, 6-TGN 85, 6-MMP 1518, Esr 14, ferritin 15, Iron sat 5 . 1/2020: The ileum appeared normal. The ascending colon and cecum appeared normal.  Biopsies were taken with a cold forceps for histology.  The pathology specimen was placed into Bottle Number 1. Vega 0. The transverse colon appeared normal.  Biopsies were taken with a cold forceps for histology.  Vega 0.  The pathology specimen was placed into Bottle Number 2. A patchy area of mildly erythematous mucosa was found in the descending colon and at the splenic flexure.  Vega 1. This was biopsied with a cold forceps for histology.  The pathology specimen was placed into Bottle Number 3. Diffuse moderate inflammation characterized by erosions and erythema was found in the rectum, in the recto-sigmoid colon and in the sigmoid colon.  Biopsies were taken with a cold forceps for histology.  The pathology specimen was placed into Bottle Number 4. Vega Score 2. . A. Right Colon , Biopsy: -Colonic mucosa with no diagnostic abnormality.  -No evidence of chronic or active colitis.  -No granulomas seen.  -Negative for dysplasia or malignancy. B. Transverse Colon , Biopsy: -Architectural and inflammatory changes consistent with primary inflammatory bowel disease with patchy moderate activity. -Negative for viral inclusions, dysplasia or malignancy. C. Left Colon , Biopsy: -Architectural and inflammatory changes consistent with primary inflammatory bowel disease with diffuse moderate activity. -Negative for viral inclusions, dysplasia or malignancy. D. Recto-Sigmoid, Colon , Biopsy: -Architectural and inflammatory changes consistent with primary inflammatory bowel disease with diffuse moderate activity. -Negative for viral inclusions, dysplasia or malignancy. . 2/2018: A. DUODENUM (BIOPSY):     SMALL BOWEL MUCOSA WITH NO SIGNIFICANT HISTOPATHOLOGY.     NO HISTOLOGIC EVIDENCE OF CELIAC SPRUE OR INFECTIOUS MICROORGANISMS.  B. STOMACH, ANTRUM (BIOPSY):     GASTRIC MUCOSA WITH NO SIGNIFICANT HISTOPATHOLOGY.     NO HELICOBACTER PYLORI MICROORGANISMS ARE IDENTIFIED WITH A SPECIAL STAIN.  C. ESOPHAGOGASTRIC JUNCTION (BIOPSY):     SQUAMOUS EPITHELIUM WITH NO SIGNIFICANT HISTOPATHOLOGY.  NO GLANDULAR EPITHELIUM SEEN.  AN ALCIAN BLUE/PAS STAIN IS NEGATIVE FOR BOTH INTESTINAL METAPLASIA AND FUNGAL ELEMENTS.  D. TERMINAL ILEUM (BIOPSY):     SMALL BOWEL MUCOSA WITH NO SIGNIFICANT HISTOPATHOLOGY.     NO HISTOLOGIC EVIDENCE OF ACTIVE OR CHRONIC ILEITIS.  NO GRANULOMATA IDENTIFIED.  E. CECUM (BIOPSY):     CHRONIC INACTIVE COLITIS.  NO GRANULOMATA OR DYSPLASIA IDENTIFIED.  SEE COMMENT.  F. COLON, ASCENDING (BIOPSY):     CHRONIC INACTIVE COLITIS.  NO GRANULOMATA OR DYSPLASIA IDENTIFIED.      G. COLON, TRANSVERSE (BIOPSY):     CHRONIC MINIMALLY ACTIVE COLITIS.  NO GRANULOMATA OR DYSPLASIA IDENTIFIED.      H. COLON, DESCENDING (BIOPSY):     CHRONIC MINIMALLY ACTIVE COLITIS.  NO GRANULOMATA OR DYSPLASIA IDENTIFIED.      I. COLON, SIGMOID (BIOPSY):     CHRONIC ACTIVE COLITIS.  NO GRANULOMATA OR DYSPLASIA IDENTIFIED.  J. Rectum CHRONIC ACTIVE PROCTITIS.  NO GRANULOMATA OR DYSPLASIA IDENTIFIED.

## 2022-12-09 ENCOUNTER — CLAIMS CREATED FROM THE CLAIM WINDOW (OUTPATIENT)
Dept: URBAN - METROPOLITAN AREA SURGERY CENTER 18 | Facility: SURGERY CENTER | Age: 47
End: 2022-12-09
Payer: COMMERCIAL

## 2022-12-09 ENCOUNTER — CLAIMS CREATED FROM THE CLAIM WINDOW (OUTPATIENT)
Dept: URBAN - METROPOLITAN AREA SURGERY CENTER 18 | Facility: SURGERY CENTER | Age: 47
End: 2022-12-09

## 2022-12-09 ENCOUNTER — CLAIMS CREATED FROM THE CLAIM WINDOW (OUTPATIENT)
Dept: URBAN - METROPOLITAN AREA CLINIC 4 | Facility: CLINIC | Age: 47
End: 2022-12-09
Payer: COMMERCIAL

## 2022-12-09 DIAGNOSIS — K51.80 OTHER ULCERATIVE COLITIS WITHOUT COMPLICATIONS: ICD-10-CM

## 2022-12-09 DIAGNOSIS — K51.00 ACUTE ULCERATIVE PANCOLITIS: ICD-10-CM

## 2022-12-09 PROBLEM — 166584001 C-REACTIVE PROTEIN ABNORMAL: Status: ACTIVE | Noted: 2022-02-01

## 2022-12-09 PROBLEM — 235595009 GASTROESOPHAGEAL REFLUX DISEASE: Status: ACTIVE | Noted: 2021-05-04

## 2022-12-09 PROCEDURE — G8907 PT DOC NO EVENTS ON DISCHARG: HCPCS | Performed by: INTERNAL MEDICINE

## 2022-12-09 PROCEDURE — 45380 COLONOSCOPY AND BIOPSY: CPT | Performed by: INTERNAL MEDICINE

## 2022-12-09 PROCEDURE — 88305 TISSUE EXAM BY PATHOLOGIST: CPT | Performed by: PATHOLOGY

## 2023-01-05 ENCOUNTER — WEB ENCOUNTER (OUTPATIENT)
Dept: URBAN - METROPOLITAN AREA CLINIC 96 | Facility: CLINIC | Age: 48
End: 2023-01-05

## 2023-01-12 ENCOUNTER — TELEPHONE ENCOUNTER (OUTPATIENT)
Dept: URBAN - METROPOLITAN AREA CLINIC 92 | Facility: CLINIC | Age: 48
End: 2023-01-12

## 2023-01-12 ENCOUNTER — WEB ENCOUNTER (OUTPATIENT)
Dept: URBAN - METROPOLITAN AREA CLINIC 96 | Facility: CLINIC | Age: 48
End: 2023-01-12

## 2023-01-19 ENCOUNTER — TELEPHONE ENCOUNTER (OUTPATIENT)
Dept: URBAN - METROPOLITAN AREA CLINIC 92 | Facility: CLINIC | Age: 48
End: 2023-01-19

## 2023-02-09 ENCOUNTER — OFFICE VISIT (OUTPATIENT)
Dept: URBAN - METROPOLITAN AREA SURGERY CENTER 18 | Facility: SURGERY CENTER | Age: 48
End: 2023-02-09
Payer: COMMERCIAL

## 2023-02-09 DIAGNOSIS — K51.00 ACUTE ULCERATIVE PANCOLITIS: ICD-10-CM

## 2023-02-09 DIAGNOSIS — Z12.11 COLON CANCER SCREENING: ICD-10-CM

## 2023-02-09 PROCEDURE — 992 NON-BILLABLE: Performed by: INTERNAL MEDICINE

## 2023-02-09 PROCEDURE — 45380 COLONOSCOPY AND BIOPSY: CPT | Performed by: INTERNAL MEDICINE

## 2023-02-09 PROCEDURE — G8907 PT DOC NO EVENTS ON DISCHARG: HCPCS | Performed by: INTERNAL MEDICINE

## 2023-02-09 RX ORDER — PANTOPRAZOLE SODIUM 40 MG/1
1 TABLET TABLET, DELAYED RELEASE ORAL BID
Qty: 60 TABLET | Refills: 11 | Status: ACTIVE | COMMUNITY

## 2023-02-09 RX ORDER — PANTOPRAZOLE SODIUM 40 MG/1
1 TABLET TABLET, DELAYED RELEASE ORAL EVERY 12 HOURS
Qty: 60 TABLET | Refills: 11 | Status: ACTIVE | COMMUNITY
Start: 2022-11-29

## 2023-02-09 RX ORDER — DIPHENOXYLATE HYDROCHLORIDE AND ATROPINE SULFATE 2.5; .025 MG/1; MG/1
1 TABLET AS NEEDED TABLET ORAL
Qty: 120 | Refills: 3 | Status: ACTIVE | COMMUNITY
Start: 2022-03-24

## 2023-02-09 RX ORDER — ONDANSETRON HYDROCHLORIDE 4 MG/1
1 TABLET TABLET, FILM COATED ORAL
Qty: 60 | Refills: 2 | Status: ACTIVE | COMMUNITY

## 2023-02-09 RX ORDER — OZANIMOD HYDROCHLORIDE 0.92 MG/1
1 CAPSULE CAPSULE ORAL ONCE A DAY
Qty: 30 | Refills: 11 | Status: ACTIVE | COMMUNITY
Start: 2022-03-17 | End: 2023-04-01

## 2023-05-12 ENCOUNTER — TELEPHONE ENCOUNTER (OUTPATIENT)
Dept: URBAN - METROPOLITAN AREA CLINIC 96 | Facility: CLINIC | Age: 48
End: 2023-05-12

## 2023-05-12 ENCOUNTER — OFFICE VISIT (OUTPATIENT)
Dept: URBAN - METROPOLITAN AREA SURGERY CENTER 18 | Facility: SURGERY CENTER | Age: 48
End: 2023-05-12
Payer: COMMERCIAL

## 2023-05-12 DIAGNOSIS — K51.00 ACUTE ULCERATIVE PANCOLITIS: ICD-10-CM

## 2023-05-12 PROCEDURE — G8907 PT DOC NO EVENTS ON DISCHARG: HCPCS | Performed by: INTERNAL MEDICINE

## 2023-05-12 PROCEDURE — 45380 COLONOSCOPY AND BIOPSY: CPT | Performed by: INTERNAL MEDICINE

## 2023-05-12 RX ORDER — PANTOPRAZOLE SODIUM 40 MG/1
1 TABLET TABLET, DELAYED RELEASE ORAL EVERY 12 HOURS
Qty: 60 TABLET | Refills: 11 | Status: ACTIVE | COMMUNITY
Start: 2022-11-29

## 2023-05-12 RX ORDER — ONDANSETRON HYDROCHLORIDE 4 MG/1
1 TABLET TABLET, FILM COATED ORAL
Qty: 60 | Refills: 2 | Status: ACTIVE | COMMUNITY

## 2023-05-12 RX ORDER — PREDNISONE 10 MG/1
2 TABLETS TABLET ORAL ONCE A DAY
Qty: 60 | Refills: 0 | OUTPATIENT
Start: 2023-05-12 | End: 2023-06-11

## 2023-05-12 RX ORDER — PANTOPRAZOLE SODIUM 40 MG/1
1 TABLET TABLET, DELAYED RELEASE ORAL BID
Qty: 60 TABLET | Refills: 11 | Status: ACTIVE | COMMUNITY

## 2023-05-12 RX ORDER — DIPHENOXYLATE HYDROCHLORIDE AND ATROPINE SULFATE 2.5; .025 MG/1; MG/1
1 TABLET AS NEEDED TABLET ORAL
Qty: 120 | Refills: 3 | Status: ACTIVE | COMMUNITY
Start: 2022-03-24

## 2023-06-12 ENCOUNTER — WEB ENCOUNTER (OUTPATIENT)
Dept: URBAN - METROPOLITAN AREA CLINIC 96 | Facility: CLINIC | Age: 48
End: 2023-06-12

## 2023-06-12 RX ORDER — UPADACITINIB 45 MG/1
1 TABLET TABLET, EXTENDED RELEASE ORAL ONCE A DAY
Qty: 30 TABLET | Refills: 1 | OUTPATIENT
Start: 2023-06-12 | End: 2023-08-11

## 2023-06-12 RX ORDER — UPADACITINIB 30 MG/1
1 TABLET TABLET, EXTENDED RELEASE ORAL ONCE A DAY
Qty: 30 | Refills: 11 | OUTPATIENT
Start: 2023-06-12 | End: 2024-06-06

## 2023-06-15 ENCOUNTER — WEB ENCOUNTER (OUTPATIENT)
Dept: URBAN - METROPOLITAN AREA CLINIC 96 | Facility: CLINIC | Age: 48
End: 2023-06-15

## 2023-06-15 RX ORDER — PREDNISONE 10 MG/1
4 TABLET TABLET ORAL ONCE A DAY
Qty: 120 TABLET | Refills: 1 | OUTPATIENT
Start: 2023-06-16 | End: 2023-08-15

## 2023-06-16 ENCOUNTER — WEB ENCOUNTER (OUTPATIENT)
Dept: URBAN - METROPOLITAN AREA CLINIC 96 | Facility: CLINIC | Age: 48
End: 2023-06-16

## 2023-07-25 ENCOUNTER — OFFICE VISIT (OUTPATIENT)
Dept: URBAN - METROPOLITAN AREA TELEHEALTH 2 | Facility: TELEHEALTH | Age: 48
End: 2023-07-25
Payer: COMMERCIAL

## 2023-07-25 ENCOUNTER — TELEPHONE ENCOUNTER (OUTPATIENT)
Dept: URBAN - METROPOLITAN AREA CLINIC 96 | Facility: CLINIC | Age: 48
End: 2023-07-25

## 2023-07-25 DIAGNOSIS — K92.1 HEMATOCHEZIA: ICD-10-CM

## 2023-07-25 DIAGNOSIS — K51.00 UNIVERSAL ULCERATIVE COLITIS WITHOUT COMPLICATION: ICD-10-CM

## 2023-07-25 DIAGNOSIS — R19.7 DIARRHEA: ICD-10-CM

## 2023-07-25 DIAGNOSIS — Z91.89 COLON CANCER HIGH RISK: ICD-10-CM

## 2023-07-25 DIAGNOSIS — K21.9 ACID REFLUX: ICD-10-CM

## 2023-07-25 PROCEDURE — 99215 OFFICE O/P EST HI 40 MIN: CPT | Performed by: INTERNAL MEDICINE

## 2023-07-25 RX ORDER — ONDANSETRON HYDROCHLORIDE 4 MG/1
1 TABLET TABLET, FILM COATED ORAL
Qty: 60 | Refills: 2 | Status: ACTIVE | COMMUNITY

## 2023-07-25 RX ORDER — DIPHENOXYLATE HYDROCHLORIDE AND ATROPINE SULFATE 2.5; .025 MG/1; MG/1
1 TABLET AS NEEDED TABLET ORAL
Qty: 120 | Refills: 3 | Status: ACTIVE | COMMUNITY
Start: 2022-03-24

## 2023-07-25 RX ORDER — PANTOPRAZOLE SODIUM 40 MG/1
1 TABLET TABLET, DELAYED RELEASE ORAL EVERY 12 HOURS
Qty: 60 TABLET | Refills: 11 | Status: ACTIVE | COMMUNITY
Start: 2022-11-29

## 2023-07-25 RX ORDER — UPADACITINIB 45 MG/1
1 TABLET TABLET, EXTENDED RELEASE ORAL ONCE A DAY
Qty: 30 TABLET | Refills: 1 | Status: ACTIVE | COMMUNITY
Start: 2023-06-12 | End: 2023-08-11

## 2023-07-25 RX ORDER — PANTOPRAZOLE SODIUM 40 MG/1
1 TABLET TABLET, DELAYED RELEASE ORAL EVERY 12 HOURS
Qty: 60 TABLET | Refills: 11 | OUTPATIENT

## 2023-07-25 RX ORDER — PANTOPRAZOLE SODIUM 40 MG/1
1 TABLET TABLET, DELAYED RELEASE ORAL BID
Qty: 60 TABLET | Refills: 11 | Status: ACTIVE | COMMUNITY

## 2023-07-25 RX ORDER — UPADACITINIB 30 MG/1
1 TABLET TABLET, EXTENDED RELEASE ORAL ONCE A DAY
Qty: 30 | Refills: 11 | Status: ACTIVE | COMMUNITY
Start: 2023-06-12 | End: 2024-06-06

## 2023-07-25 RX ORDER — PREDNISONE 10 MG/1
4 TABLET TABLET ORAL ONCE A DAY
Qty: 120 TABLET | Refills: 1 | Status: ACTIVE | COMMUNITY
Start: 2023-06-16 | End: 2023-08-15

## 2023-07-25 NOTE — HPI-TODAY'S VISIT:
Pt Sheree is a 47 y/o individual with pan-UC, currently on Rinvoq (started July 2023), Zeposia (started 5/2022 stopped), Stelara (started on 1/2021 stopped 4/2022) and previously xeljanz (10 BID started 4/2020 and stopped 12/2020) and previously Imuran (100mg), previously on Remicade (started 1/2020) and previously on Entyvio (every 4 weeks) and additional 2 week dose of entyvio, here for refractory disease. . She was diagnosed with UC at age 39.  She initially had proctitis, and was started on Lialda.  She did not get better, so started on Remicade.  Remicade worked, but developed muscle spasms on it (for 6 months of therapy).  Was not on therapy for about 1 year and found to have pan-UC.  Humira for 3 months then stopped, partial response.  She then started on Entyvio (2017).  It helped somewhat.  She has not taken steroids in a while.  She then went on Entyvio and then went to xeljanz (started 4/2020 and stopped 12/2020). . Previously on 12/7/2021, pt reports that she got the booster dose of Stelara in August but then has not get the injection due to insurance denial.  We gave her a sample of the Stelara, which IMPROVED THE DIARRHEA and developed constipation.  However, b/c of delay, now bleeding again. . Previously on 2/1/2022, pt reports that she has rectal bleeding, she has 4-5 BM per day, still having urgency.  Taking Lomotil/bentyl around the clock when working. . Previously on 11/29/2022, pt reports that she has daily rectal bleeding, lot of diarrhea.  Had major flare.  Lot of cramping, urgency sxs.  Had very good response to zeposia earlier, but not doing so good now. . Today on 7/25/2023, pt reports no longer having abd pain, not on steroids.  Slightly more formed stool, freq down to 8-10BM.  Blood is still present.  Joint pain improves with Medrol pack. . SH: No smoke, occasional etoh; works at RN at [employer] : No IBD . Labs: 5/2021: stelara 0.6 (no Ab) . 1/2020: IFX AB neg 10/2019: CRP 37, Vedo 15, Stool culture neg and c diff neg 9/2019: Hgb 9.8, ast 8, alt 6,  1/2019: Hgb 9.7, ast 14, alt 8, Crp 21.2, 6-TGN 85, 6-MMP 1518, Esr 14, ferritin 15, Iron sat 5 . 1/2020: The ileum appeared normal. The ascending colon and cecum appeared normal.  Biopsies were taken with a cold forceps for histology.  The pathology specimen was placed into Bottle Number 1. Vega 0. The transverse colon appeared normal.  Biopsies were taken with a cold forceps for histology.  Vega 0.  The pathology specimen was placed into Bottle Number 2. A patchy area of mildly erythematous mucosa was found in the descending colon and at the splenic flexure.  Vega 1. This was biopsied with a cold forceps for histology.  The pathology specimen was placed into Bottle Number 3. Diffuse moderate inflammation characterized by erosions and erythema was found in the rectum, in the recto-sigmoid colon and in the sigmoid colon.  Biopsies were taken with a cold forceps for histology.  The pathology specimen was placed into Bottle Number 4. Vega Score 2. . A. Right Colon , Biopsy: -Colonic mucosa with no diagnostic abnormality.  -No evidence of chronic or active colitis.  -No granulomas seen.  -Negative for dysplasia or malignancy. B. Transverse Colon , Biopsy: -Architectural and inflammatory changes consistent with primary inflammatory bowel disease with patchy moderate activity. -Negative for viral inclusions, dysplasia or malignancy. C. Left Colon , Biopsy: -Architectural and inflammatory changes consistent with primary inflammatory bowel disease with diffuse moderate activity. -Negative for viral inclusions, dysplasia or malignancy. D. Recto-Sigmoid, Colon , Biopsy: -Architectural and inflammatory changes consistent with primary inflammatory bowel disease with diffuse moderate activity. -Negative for viral inclusions, dysplasia or malignancy. . 2/2018: A. DUODENUM (BIOPSY):     SMALL BOWEL MUCOSA WITH NO SIGNIFICANT HISTOPATHOLOGY.     NO HISTOLOGIC EVIDENCE OF CELIAC SPRUE OR INFECTIOUS MICROORGANISMS.  B. STOMACH, ANTRUM (BIOPSY):     GASTRIC MUCOSA WITH NO SIGNIFICANT HISTOPATHOLOGY.     NO HELICOBACTER PYLORI MICROORGANISMS ARE IDENTIFIED WITH A SPECIAL STAIN.  C. ESOPHAGOGASTRIC JUNCTION (BIOPSY):     SQUAMOUS EPITHELIUM WITH NO SIGNIFICANT HISTOPATHOLOGY.  NO GLANDULAR EPITHELIUM SEEN.  AN ALCIAN BLUE/PAS STAIN IS NEGATIVE FOR BOTH INTESTINAL METAPLASIA AND FUNGAL ELEMENTS.  D. TERMINAL ILEUM (BIOPSY):     SMALL BOWEL MUCOSA WITH NO SIGNIFICANT HISTOPATHOLOGY.     NO HISTOLOGIC EVIDENCE OF ACTIVE OR CHRONIC ILEITIS.  NO GRANULOMATA IDENTIFIED.  E. CECUM (BIOPSY):     CHRONIC INACTIVE COLITIS.  NO GRANULOMATA OR DYSPLASIA IDENTIFIED.  SEE COMMENT.  F. COLON, ASCENDING (BIOPSY):     CHRONIC INACTIVE COLITIS.  NO GRANULOMATA OR DYSPLASIA IDENTIFIED.      G. COLON, TRANSVERSE (BIOPSY):     CHRONIC MINIMALLY ACTIVE COLITIS.  NO GRANULOMATA OR DYSPLASIA IDENTIFIED.      H. COLON, DESCENDING (BIOPSY):     CHRONIC MINIMALLY ACTIVE COLITIS.  NO GRANULOMATA OR DYSPLASIA IDENTIFIED.      I. COLON, SIGMOID (BIOPSY):     CHRONIC ACTIVE COLITIS.  NO GRANULOMATA OR DYSPLASIA IDENTIFIED.  J. Rectum CHRONIC ACTIVE PROCTITIS.  NO GRANULOMATA OR DYSPLASIA IDENTIFIED.

## 2023-08-28 ENCOUNTER — WEB ENCOUNTER (OUTPATIENT)
Dept: URBAN - METROPOLITAN AREA CLINIC 96 | Facility: CLINIC | Age: 48
End: 2023-08-28

## 2023-08-28 RX ORDER — FERRIC CARBOXYMALTOSE INJECTION 50 MG/ML
AS DIRECTED INJECTION, SOLUTION INTRAVENOUS
Qty: 2 | Refills: 0 | OUTPATIENT
Start: 2023-08-29 | End: 2023-09-12

## 2023-09-02 LAB
A/G RATIO: 1.5
ALBUMIN: 4.1
ALKALINE PHOSPHATASE: 51
ALT (SGPT): 9
AST (SGOT): 13
BASO (ABSOLUTE): 0
BASOS: 1
BILIRUBIN, TOTAL: <0.2
BUN/CREATININE RATIO: 16
BUN: 12
C-REACTIVE PROTEIN, QUANT: 12
CALCIUM: 8.9
CARBON DIOXIDE, TOTAL: 22
CHLORIDE: 104
CHOLESTEROL, TOTAL: 228
COMMENT:: (no result)
CREATININE: 0.77
EGFR: 95
EOS (ABSOLUTE): 0.3
EOS: 4
GLOBULIN, TOTAL: 2.8
GLUCOSE: 96
HDL CHOLESTEROL: 57
HEMATOCRIT: 31.9
HEMATOLOGY COMMENTS:: (no result)
HEMOGLOBIN: 9.5
IMMATURE CELLS: (no result)
IMMATURE GRANS (ABS): 0.1
IMMATURE GRANULOCYTES: 1
LDL CHOL CALC (NIH): 145
LYMPHS (ABSOLUTE): 1.1
LYMPHS: 16
MCH: 24.7
MCHC: 29.8
MCV: 83
MONOCYTES(ABSOLUTE): 0.6
MONOCYTES: 8
NEUTROPHILS (ABSOLUTE): 5.1
NEUTROPHILS: 70
NRBC: (no result)
PLATELETS: 450
POTASSIUM: 4.7
PROTEIN, TOTAL: 6.9
RBC: 3.85
RDW: 16.3
SODIUM: 140
TRIGLYCERIDES: 146
VLDL CHOLESTEROL CAL: 26
WBC: 7.2

## 2023-09-03 LAB
FERRITIN, SERUM: (no result)
REQUEST PROBLEM: (no result)

## 2023-09-07 ENCOUNTER — TELEPHONE ENCOUNTER (OUTPATIENT)
Dept: URBAN - METROPOLITAN AREA CLINIC 96 | Facility: CLINIC | Age: 48
End: 2023-09-07

## 2023-09-08 ENCOUNTER — LAB OUTSIDE AN ENCOUNTER (OUTPATIENT)
Dept: URBAN - METROPOLITAN AREA CLINIC 96 | Facility: CLINIC | Age: 48
End: 2023-09-08

## 2023-09-09 LAB
BASO (ABSOLUTE): 0.1
BASOS: 1
EOS (ABSOLUTE): 0.2
EOS: 2
FERRITIN, SERUM: 8
HEMATOCRIT: 32.5
HEMATOLOGY COMMENTS:: (no result)
HEMOGLOBIN: 10.1
IMMATURE CELLS: (no result)
IMMATURE GRANS (ABS): 0.1
IMMATURE GRANULOCYTES: 1
LYMPHS (ABSOLUTE): 0.8
LYMPHS: 7
MCH: 25
MCHC: 31.1
MCV: 80
MONOCYTES(ABSOLUTE): 0.6
MONOCYTES: 5
NEUTROPHILS (ABSOLUTE): 9.9
NEUTROPHILS: 84
NRBC: (no result)
PLATELETS: 457
RBC: 4.04
RDW: 16.4
WBC: 11.7

## 2023-09-11 ENCOUNTER — WEB ENCOUNTER (OUTPATIENT)
Dept: URBAN - METROPOLITAN AREA CLINIC 96 | Facility: CLINIC | Age: 48
End: 2023-09-11

## 2023-09-11 ENCOUNTER — TELEPHONE ENCOUNTER (OUTPATIENT)
Dept: URBAN - METROPOLITAN AREA CLINIC 97 | Facility: CLINIC | Age: 48
End: 2023-09-11

## 2023-09-21 ENCOUNTER — OFFICE VISIT (OUTPATIENT)
Dept: URBAN - METROPOLITAN AREA CLINIC 79 | Facility: CLINIC | Age: 48
End: 2023-09-21
Payer: COMMERCIAL

## 2023-09-21 VITALS
SYSTOLIC BLOOD PRESSURE: 100 MMHG | WEIGHT: 248 LBS | HEART RATE: 75 BPM | DIASTOLIC BLOOD PRESSURE: 65 MMHG | RESPIRATION RATE: 20 BRPM | HEIGHT: 64 IN | BODY MASS INDEX: 42.34 KG/M2 | TEMPERATURE: 97.9 F

## 2023-09-21 DIAGNOSIS — D50.8 OTHER IRON DEFICIENCY ANEMIA: ICD-10-CM

## 2023-09-21 PROCEDURE — 96365 THER/PROPH/DIAG IV INF INIT: CPT | Performed by: INTERNAL MEDICINE

## 2023-09-21 RX ORDER — UPADACITINIB 30 MG/1
1 TABLET TABLET, EXTENDED RELEASE ORAL ONCE A DAY
Qty: 30 | Refills: 11 | Status: ACTIVE | COMMUNITY
Start: 2023-06-12 | End: 2024-06-06

## 2023-09-21 RX ORDER — PANTOPRAZOLE SODIUM 40 MG/1
1 TABLET TABLET, DELAYED RELEASE ORAL BID
Qty: 60 TABLET | Refills: 11 | Status: ACTIVE | COMMUNITY

## 2023-09-21 RX ORDER — DIPHENOXYLATE HYDROCHLORIDE AND ATROPINE SULFATE 2.5; .025 MG/1; MG/1
1 TABLET AS NEEDED TABLET ORAL
Qty: 120 | Refills: 3 | Status: ACTIVE | COMMUNITY
Start: 2022-03-24

## 2023-09-21 RX ORDER — PANTOPRAZOLE SODIUM 40 MG/1
1 TABLET TABLET, DELAYED RELEASE ORAL EVERY 12 HOURS
Qty: 60 TABLET | Refills: 11 | Status: ACTIVE | COMMUNITY

## 2023-09-21 RX ORDER — ONDANSETRON HYDROCHLORIDE 4 MG/1
1 TABLET TABLET, FILM COATED ORAL
Qty: 60 | Refills: 2 | Status: ACTIVE | COMMUNITY

## 2023-09-28 ENCOUNTER — OFFICE VISIT (OUTPATIENT)
Dept: URBAN - METROPOLITAN AREA CLINIC 79 | Facility: CLINIC | Age: 48
End: 2023-09-28
Payer: COMMERCIAL

## 2023-09-28 VITALS
HEART RATE: 74 BPM | SYSTOLIC BLOOD PRESSURE: 124 MMHG | HEIGHT: 64 IN | DIASTOLIC BLOOD PRESSURE: 70 MMHG | RESPIRATION RATE: 20 BRPM | BODY MASS INDEX: 42.34 KG/M2 | TEMPERATURE: 97.9 F | WEIGHT: 248 LBS

## 2023-09-28 DIAGNOSIS — D50.8 OTHER IRON DEFICIENCY ANEMIA: ICD-10-CM

## 2023-09-28 PROCEDURE — 96365 THER/PROPH/DIAG IV INF INIT: CPT | Performed by: INTERNAL MEDICINE

## 2023-09-28 RX ORDER — UPADACITINIB 30 MG/1
1 TABLET TABLET, EXTENDED RELEASE ORAL ONCE A DAY
Qty: 30 | Refills: 11 | Status: ACTIVE | COMMUNITY
Start: 2023-06-12 | End: 2024-06-06

## 2023-09-28 RX ORDER — DIPHENOXYLATE HYDROCHLORIDE AND ATROPINE SULFATE 2.5; .025 MG/1; MG/1
1 TABLET AS NEEDED TABLET ORAL
Qty: 120 | Refills: 3 | Status: ACTIVE | COMMUNITY
Start: 2022-03-24

## 2023-09-28 RX ORDER — PANTOPRAZOLE SODIUM 40 MG/1
1 TABLET TABLET, DELAYED RELEASE ORAL BID
Qty: 60 TABLET | Refills: 11 | Status: ACTIVE | COMMUNITY

## 2023-09-28 RX ORDER — PANTOPRAZOLE SODIUM 40 MG/1
1 TABLET TABLET, DELAYED RELEASE ORAL EVERY 12 HOURS
Qty: 60 TABLET | Refills: 11 | Status: ACTIVE | COMMUNITY

## 2023-09-28 RX ORDER — ONDANSETRON HYDROCHLORIDE 4 MG/1
1 TABLET TABLET, FILM COATED ORAL
Qty: 60 | Refills: 2 | Status: ACTIVE | COMMUNITY

## 2023-12-18 ENCOUNTER — TELEPHONE ENCOUNTER (OUTPATIENT)
Dept: URBAN - METROPOLITAN AREA CLINIC 96 | Facility: CLINIC | Age: 48
End: 2023-12-18

## 2023-12-18 RX ORDER — PANTOPRAZOLE SODIUM 40 MG/1
1 TABLET TABLET, DELAYED RELEASE ORAL EVERY 12 HOURS
Qty: 60 TABLET | Refills: 11

## 2024-02-06 ENCOUNTER — OV EP (OUTPATIENT)
Dept: URBAN - METROPOLITAN AREA CLINIC 96 | Facility: CLINIC | Age: 49
End: 2024-02-06
Payer: COMMERCIAL

## 2024-02-06 ENCOUNTER — LAB (OUTPATIENT)
Dept: URBAN - METROPOLITAN AREA CLINIC 96 | Facility: CLINIC | Age: 49
End: 2024-02-06

## 2024-02-06 VITALS
DIASTOLIC BLOOD PRESSURE: 79 MMHG | HEART RATE: 77 BPM | TEMPERATURE: 97.8 F | SYSTOLIC BLOOD PRESSURE: 125 MMHG | HEIGHT: 64 IN | BODY MASS INDEX: 42.88 KG/M2 | WEIGHT: 251.2 LBS

## 2024-02-06 DIAGNOSIS — R19.7 DIARRHEA: ICD-10-CM

## 2024-02-06 DIAGNOSIS — D50.9 IRON (FE) DEFICIENCY ANEMIA: ICD-10-CM

## 2024-02-06 DIAGNOSIS — R10.13 DYSPEPSIA: ICD-10-CM

## 2024-02-06 DIAGNOSIS — Z09 FOLLOW UP: ICD-10-CM

## 2024-02-06 DIAGNOSIS — M25.50 ARTHRALGIA: ICD-10-CM

## 2024-02-06 DIAGNOSIS — R79.82 CRP ELEVATED: ICD-10-CM

## 2024-02-06 DIAGNOSIS — K51.00 UNIVERSAL ULCERATIVE COLITIS WITHOUT COMPLICATION: ICD-10-CM

## 2024-02-06 DIAGNOSIS — K21.9 GERD (GASTROESOPHAGEAL REFLUX DISEASE): ICD-10-CM

## 2024-02-06 DIAGNOSIS — Z91.89 COLON CANCER HIGH RISK: ICD-10-CM

## 2024-02-06 DIAGNOSIS — K92.1 HEMATOCHEZIA: ICD-10-CM

## 2024-02-06 PROCEDURE — 99215 OFFICE O/P EST HI 40 MIN: CPT | Performed by: INTERNAL MEDICINE

## 2024-02-06 RX ORDER — UPADACITINIB 30 MG/1
1 TABLET TABLET, EXTENDED RELEASE ORAL ONCE A DAY
Qty: 30 | Refills: 11 | Status: ACTIVE | COMMUNITY
Start: 2023-06-12 | End: 2024-06-06

## 2024-02-06 RX ORDER — ONDANSETRON 4 MG/1
1 TABLET ON THE TONGUE AND ALLOW TO DISSOLVE TABLET, ORALLY DISINTEGRATING ORAL TWICE A DAY
Qty: 60 TABLET | Refills: 3 | OUTPATIENT
Start: 2024-02-06

## 2024-02-06 RX ORDER — PANTOPRAZOLE SODIUM 40 MG/1
1 TABLET TABLET, DELAYED RELEASE ORAL EVERY 12 HOURS
Qty: 60 TABLET | Refills: 11 | Status: ACTIVE | COMMUNITY

## 2024-02-06 RX ORDER — DIPHENOXYLATE HYDROCHLORIDE AND ATROPINE SULFATE 2.5; .025 MG/1; MG/1
1 TABLET AS NEEDED TABLET ORAL
Qty: 120 | Refills: 3 | Status: ACTIVE | COMMUNITY
Start: 2022-03-24

## 2024-02-06 RX ORDER — DIPHENOXYLATE HYDROCHLORIDE AND ATROPINE SULFATE 2.5; .025 MG/1; MG/1
1 TABLET AS NEEDED TABLET ORAL
Qty: 120 | Refills: 3 | OUTPATIENT
Start: 2024-02-06 | End: 2024-06-05

## 2024-02-06 RX ORDER — DICYCLOMINE HYDROCHLORIDE 20 MG/1
1 TABLET TABLET ORAL THREE TIMES A DAY
Qty: 90 TABLET | Refills: 11 | OUTPATIENT
Start: 2024-02-06 | End: 2025-01-31

## 2024-02-06 RX ORDER — PANTOPRAZOLE SODIUM 40 MG/1
1 TABLET TABLET, DELAYED RELEASE ORAL EVERY 12 HOURS
Qty: 180 TABLET | Refills: 3 | OUTPATIENT

## 2024-02-06 RX ORDER — ONDANSETRON HYDROCHLORIDE 4 MG/1
1 TABLET TABLET, FILM COATED ORAL
Qty: 60 | Refills: 2 | Status: ACTIVE | COMMUNITY

## 2024-02-06 NOTE — HPI-TODAY'S VISIT:
Pt Sheree is a 49 y/o individual with pan-UC, currently on Rinvoq (started July 2023), Zeposia (started 5/2022 stopped), Stelara (started on 1/2021 stopped 4/2022) and previously xeljanz (10 BID started 4/2020 and stopped 12/2020) and previously Imuran (100mg), previously on Remicade (started 1/2020) and previously on Entyvio (every 4 weeks) and additional 2 week dose of entyvio, here for refractory disease. . She was diagnosed with UC at age 39.  She initially had proctitis, and was started on Lialda.  She did not get better, so started on Remicade.  Remicade worked, but developed muscle spasms on it (for 6 months of therapy).  Was not on therapy for about 1 year and found to have pan-UC.  Humira for 3 months then stopped, partial response.  She then started on Entyvio (2017).  It helped somewhat.  She has not taken steroids in a while.  She then went on Entyvio and then went to xeljanz (started 4/2020 and stopped 12/2020). . Previously on 12/7/2021, pt reports that she got the booster dose of Stelara in August but then has not get the injection due to insurance denial.  We gave her a sample of the Stelara, which IMPROVED THE DIARRHEA and developed constipation.  However, b/c of delay, now bleeding again. . Previously on 2/1/2022, pt reports that she has rectal bleeding, she has 4-5 BM per day, still having urgency.  Taking Lomotil/bentyl around the clock when working. . Previously on 11/29/2022, pt reports that she has daily rectal bleeding, lot of diarrhea.  Had major flare.  Lot of cramping, urgency sxs.  Had very good response to zeposia earlier, but not doing so good now. . Previously on 7/25/2023, pt reports no longer having abd pain, not on steroids.  Slightly more formed stool, freq down to 8-10BM.  Blood is still present. . Today on 2/6/2024, pt reports that she still has abdominal pain, not as bad; 8-10 BM with blood.  Pain is better.  No steroids. . Joint pain improves with Medrol pack. . 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 Labs: 5/2021: stelara 0.6 (no Ab) . 1/2020: IFX AB neg 10/2019: CRP 37, Vedo 15, Stool culture neg and c diff neg 9/2019: Hgb 9.8, ast 8, alt 6,  1/2019: Hgb 9.7, ast 14, alt 8, Crp 21.2, 6-TGN 85, 6-MMP 1518, Esr 14, ferritin 15, Iron sat 5 . 1/2020: The ileum appeared normal. The ascending colon and cecum appeared normal.  Biopsies were taken with a cold forceps for histology.  The pathology specimen was placed into Bottle Number 1. Vega 0. The transverse colon appeared normal.  Biopsies were taken with a cold forceps for histology.  Vega 0.  The pathology specimen was placed into Bottle Number 2. A patchy area of mildly erythematous mucosa was found in the descending colon and at the splenic flexure.  Vega 1. This was biopsied with a cold forceps for histology.  The pathology specimen was placed into Bottle Number 3. Diffuse moderate inflammation characterized by erosions and erythema was found in the rectum, in the recto-sigmoid colon and in the sigmoid colon.  Biopsies were taken with a cold forceps for histology.  The pathology specimen was placed into Bottle Number 4. Vega Score 2. . A. Right Colon , Biopsy: -Colonic mucosa with no diagnostic abnormality.  -No evidence of chronic or active colitis.  -No granulomas seen.  -Negative for dysplasia or malignancy. B. Transverse Colon , Biopsy: -Architectural and inflammatory changes consistent with primary inflammatory bowel disease with patchy moderate activity. -Negative for viral inclusions, dysplasia or malignancy. C. Left Colon , Biopsy: -Architectural and inflammatory changes consistent with primary inflammatory bowel disease with diffuse moderate activity. -Negative for viral inclusions, dysplasia or malignancy. D. Recto-Sigmoid, Colon , Biopsy: -Architectural and inflammatory changes consistent with primary inflammatory bowel disease with diffuse moderate activity. -Negative for viral inclusions, dysplasia or malignancy. . 2/2018: A. DUODENUM (BIOPSY):     SMALL BOWEL MUCOSA WITH NO SIGNIFICANT HISTOPATHOLOGY.     NO HISTOLOGIC EVIDENCE OF CELIAC SPRUE OR INFECTIOUS MICROORGANISMS.  B. STOMACH, ANTRUM (BIOPSY):     GASTRIC MUCOSA WITH NO SIGNIFICANT HISTOPATHOLOGY.     NO HELICOBACTER PYLORI MICROORGANISMS ARE IDENTIFIED WITH A SPECIAL STAIN.  C. ESOPHAGOGASTRIC JUNCTION (BIOPSY):     SQUAMOUS EPITHELIUM WITH NO SIGNIFICANT HISTOPATHOLOGY.  NO GLANDULAR EPITHELIUM SEEN.  AN ALCIAN BLUE/PAS STAIN IS NEGATIVE FOR BOTH INTESTINAL METAPLASIA AND FUNGAL ELEMENTS.  D. TERMINAL ILEUM (BIOPSY):     SMALL BOWEL MUCOSA WITH NO SIGNIFICANT HISTOPATHOLOGY.     NO HISTOLOGIC EVIDENCE OF ACTIVE OR CHRONIC ILEITIS.  NO GRANULOMATA IDENTIFIED.  E. CECUM (BIOPSY):     CHRONIC INACTIVE COLITIS.  NO GRANULOMATA OR DYSPLASIA IDENTIFIED.  SEE COMMENT.  F. COLON, ASCENDING (BIOPSY):     CHRONIC INACTIVE COLITIS.  NO GRANULOMATA OR DYSPLASIA IDENTIFIED.      G. COLON, TRANSVERSE (BIOPSY):     CHRONIC MINIMALLY ACTIVE COLITIS.  NO GRANULOMATA OR DYSPLASIA IDENTIFIED.      H. COLON, DESCENDING (BIOPSY):     CHRONIC MINIMALLY ACTIVE COLITIS.  NO GRANULOMATA OR DYSPLASIA IDENTIFIED.      I. COLON, SIGMOID (BIOPSY):     CHRONIC ACTIVE COLITIS.  NO GRANULOMATA OR DYSPLASIA IDENTIFIED.  J. Rectum CHRONIC ACTIVE PROCTITIS.  NO GRANULOMATA OR DYSPLASIA IDENTIFIED.

## 2024-08-09 ENCOUNTER — CLAIMS CREATED FROM THE CLAIM WINDOW (OUTPATIENT)
Dept: URBAN - METROPOLITAN AREA SURGERY CENTER 18 | Facility: SURGERY CENTER | Age: 49
End: 2024-08-09
Payer: SUBSIDIZED

## 2024-08-09 DIAGNOSIS — K51.90 ULCERATIVE COLITIS: ICD-10-CM

## 2024-08-09 DIAGNOSIS — K52.9 INFLAMMATION OF INTESTINE: ICD-10-CM

## 2024-08-09 DIAGNOSIS — K21.9 GERD: ICD-10-CM

## 2024-08-09 DIAGNOSIS — Z12.11 COLON CANCER SCREENING (HIGH RISK): ICD-10-CM

## 2024-08-09 DIAGNOSIS — K51.00 ACUTE ULCERATIVE PANCOLITIS: ICD-10-CM

## 2024-08-09 PROCEDURE — 45380 COLONOSCOPY AND BIOPSY: CPT | Performed by: INTERNAL MEDICINE

## 2024-08-09 PROCEDURE — 00811 ANES LWR INTST NDSC NOS: CPT | Performed by: NURSE ANESTHETIST, CERTIFIED REGISTERED

## 2024-08-09 RX ORDER — DICYCLOMINE HYDROCHLORIDE 20 MG/1
1 TABLET TABLET ORAL THREE TIMES A DAY
Qty: 90 TABLET | Refills: 11 | Status: ACTIVE | COMMUNITY
Start: 2024-02-06 | End: 2025-01-31

## 2024-08-09 RX ORDER — DIPHENOXYLATE HYDROCHLORIDE AND ATROPINE SULFATE 2.5; .025 MG/1; MG/1
1 TABLET AS NEEDED TABLET ORAL
Qty: 120 | Refills: 3 | Status: ACTIVE | COMMUNITY
Start: 2022-03-24

## 2024-08-09 RX ORDER — PREDNISONE 10 MG/1
2 TABLETS TABLET ORAL ONCE A DAY
Qty: 60 | Refills: 0 | OUTPATIENT
Start: 2024-08-09 | End: 2024-09-08

## 2024-08-09 RX ORDER — PANTOPRAZOLE SODIUM 40 MG/1
1 TABLET TABLET, DELAYED RELEASE ORAL EVERY 12 HOURS
Qty: 180 TABLET | Refills: 3 | Status: ACTIVE | COMMUNITY

## 2024-08-09 RX ORDER — ONDANSETRON HYDROCHLORIDE 4 MG/1
1 TABLET TABLET, FILM COATED ORAL
Qty: 60 | Refills: 2 | Status: ACTIVE | COMMUNITY

## 2024-08-09 RX ORDER — PANTOPRAZOLE SODIUM 40 MG/1
1 TABLET TABLET, DELAYED RELEASE ORAL EVERY 12 HOURS
Qty: 60 TABLET | Refills: 11 | Status: ACTIVE | COMMUNITY

## 2024-08-09 RX ORDER — ONDANSETRON 4 MG/1
1 TABLET ON THE TONGUE AND ALLOW TO DISSOLVE TABLET, ORALLY DISINTEGRATING ORAL TWICE A DAY
Qty: 60 TABLET | Refills: 3 | Status: ACTIVE | COMMUNITY
Start: 2024-02-06

## 2024-09-19 NOTE — PHYSICAL EXAM NECK/THYROID:
normal appearance , without tenderness upon palpation , no deformities , trachea midline , Thyroid normal size , no thyroid nodules , no masses , no JVD , thyroid nontender
Negative

## 2024-11-21 ENCOUNTER — CLAIMS CREATED FROM THE CLAIM WINDOW (OUTPATIENT)
Dept: URBAN - METROPOLITAN AREA SURGERY CENTER 18 | Facility: SURGERY CENTER | Age: 49
End: 2024-11-21
Payer: SUBSIDIZED

## 2024-11-21 ENCOUNTER — OFFICE VISIT (OUTPATIENT)
Dept: URBAN - METROPOLITAN AREA SURGERY CENTER 18 | Facility: SURGERY CENTER | Age: 49
End: 2024-11-21

## 2024-11-21 DIAGNOSIS — K62.89 OTHER SPECIFIED DISEASES OF ANUS AND RECTUM: ICD-10-CM

## 2024-11-21 DIAGNOSIS — Z87.19 PERSONAL HISTORY OF OTHER DISEASES OF THE DIGESTIVE SYSTEM: ICD-10-CM

## 2024-11-21 DIAGNOSIS — K51.00 PANCOLITIS: ICD-10-CM

## 2024-11-21 PROCEDURE — 00811 ANES LWR INTST NDSC NOS: CPT | Performed by: NURSE ANESTHETIST, CERTIFIED REGISTERED

## 2024-11-21 RX ORDER — PANTOPRAZOLE SODIUM 40 MG/1
1 TABLET TABLET, DELAYED RELEASE ORAL EVERY 12 HOURS
Qty: 180 TABLET | Refills: 3 | Status: ACTIVE | COMMUNITY

## 2024-11-21 RX ORDER — PANTOPRAZOLE SODIUM 40 MG/1
1 TABLET TABLET, DELAYED RELEASE ORAL EVERY 12 HOURS
Qty: 60 TABLET | Refills: 11 | Status: ACTIVE | COMMUNITY

## 2024-11-21 RX ORDER — ONDANSETRON HYDROCHLORIDE 4 MG/1
1 TABLET TABLET, FILM COATED ORAL
Qty: 60 | Refills: 2 | Status: ACTIVE | COMMUNITY

## 2024-11-21 RX ORDER — DICYCLOMINE HYDROCHLORIDE 20 MG/1
1 TABLET TABLET ORAL THREE TIMES A DAY
Qty: 90 TABLET | Refills: 11 | Status: ACTIVE | COMMUNITY
Start: 2024-02-06 | End: 2025-01-31

## 2024-11-21 RX ORDER — ONDANSETRON 4 MG/1
1 TABLET ON THE TONGUE AND ALLOW TO DISSOLVE TABLET, ORALLY DISINTEGRATING ORAL TWICE A DAY
Qty: 60 TABLET | Refills: 3 | Status: ACTIVE | COMMUNITY
Start: 2024-02-06

## 2024-11-21 RX ORDER — DIPHENOXYLATE HYDROCHLORIDE AND ATROPINE SULFATE 2.5; .025 MG/1; MG/1
1 TABLET AS NEEDED TABLET ORAL
Qty: 120 | Refills: 3 | Status: ACTIVE | COMMUNITY
Start: 2022-03-24

## 2024-12-10 ENCOUNTER — TELEPHONE ENCOUNTER (OUTPATIENT)
Dept: URBAN - METROPOLITAN AREA CLINIC 96 | Facility: CLINIC | Age: 49
End: 2024-12-10

## 2024-12-10 ENCOUNTER — LAB OUTSIDE AN ENCOUNTER (OUTPATIENT)
Dept: URBAN - METROPOLITAN AREA CLINIC 96 | Facility: CLINIC | Age: 49
End: 2024-12-10

## 2025-01-03 ENCOUNTER — OFFICE VISIT (OUTPATIENT)
Dept: URBAN - METROPOLITAN AREA MEDICAL CENTER 28 | Facility: MEDICAL CENTER | Age: 50
End: 2025-01-03
Payer: COMMERCIAL

## 2025-01-03 DIAGNOSIS — D12.8 RECTAL ADENOMA: ICD-10-CM

## 2025-01-03 DIAGNOSIS — K51.00 ULCERATIVE PANCOLITIS: ICD-10-CM

## 2025-01-03 DIAGNOSIS — K56.699 OTHER INTESTINAL OBSTRUCTION UNSPECIFIED AS TO PARTIAL VERSUS COMPLETE OBSTRUCTION: ICD-10-CM

## 2025-01-03 PROCEDURE — 45380 COLONOSCOPY AND BIOPSY: CPT | Performed by: INTERNAL MEDICINE

## 2025-01-06 ENCOUNTER — TELEPHONE ENCOUNTER (OUTPATIENT)
Dept: URBAN - METROPOLITAN AREA CLINIC 96 | Facility: CLINIC | Age: 50
End: 2025-01-06

## 2025-01-06 ENCOUNTER — LAB OUTSIDE AN ENCOUNTER (OUTPATIENT)
Dept: URBAN - METROPOLITAN AREA CLINIC 96 | Facility: CLINIC | Age: 50
End: 2025-01-06

## 2025-01-07 ENCOUNTER — TELEPHONE ENCOUNTER (OUTPATIENT)
Dept: URBAN - METROPOLITAN AREA CLINIC 96 | Facility: CLINIC | Age: 50
End: 2025-01-07

## 2025-02-11 ENCOUNTER — OFFICE VISIT (OUTPATIENT)
Dept: URBAN - METROPOLITAN AREA MEDICAL CENTER 28 | Facility: MEDICAL CENTER | Age: 50
End: 2025-02-11
Payer: COMMERCIAL

## 2025-02-11 DIAGNOSIS — K62.89 PROCTITIS: ICD-10-CM

## 2025-02-11 DIAGNOSIS — Z86.0101 PERSONAL HISTORY OF ADENOMATOUS AND SERRATED COLON POLYPS: ICD-10-CM

## 2025-02-11 PROCEDURE — 45331 SIGMOIDOSCOPY AND BIOPSY: CPT | Performed by: INTERNAL MEDICINE

## 2025-02-17 ENCOUNTER — TELEPHONE ENCOUNTER (OUTPATIENT)
Dept: URBAN - METROPOLITAN AREA CLINIC 96 | Facility: CLINIC | Age: 50
End: 2025-02-17

## 2025-02-17 ENCOUNTER — LAB OUTSIDE AN ENCOUNTER (OUTPATIENT)
Dept: URBAN - METROPOLITAN AREA CLINIC 96 | Facility: CLINIC | Age: 50
End: 2025-02-17

## 2025-02-18 ENCOUNTER — OFFICE VISIT (OUTPATIENT)
Dept: URBAN - METROPOLITAN AREA CLINIC 98 | Facility: CLINIC | Age: 50
End: 2025-02-18

## 2025-04-25 ENCOUNTER — WEB ENCOUNTER (OUTPATIENT)
Dept: URBAN - METROPOLITAN AREA CLINIC 96 | Facility: CLINIC | Age: 50
End: 2025-04-25

## 2025-04-25 RX ORDER — PANTOPRAZOLE SODIUM 40 MG/1
1 TABLET TABLET, DELAYED RELEASE ORAL EVERY 12 HOURS
Qty: 180 | Refills: 3

## 2025-08-01 ENCOUNTER — WEB ENCOUNTER (OUTPATIENT)
Dept: URBAN - METROPOLITAN AREA CLINIC 96 | Facility: CLINIC | Age: 50
End: 2025-08-01

## 2025-08-12 ENCOUNTER — TELEPHONE ENCOUNTER (OUTPATIENT)
Dept: URBAN - METROPOLITAN AREA CLINIC 96 | Facility: CLINIC | Age: 50
End: 2025-08-12

## 2025-08-12 RX ORDER — PREDNISONE 10 MG/1
2 TABLETS TABLET ORAL ONCE A DAY
Qty: 60 | Refills: 0 | OUTPATIENT
Start: 2025-08-12

## 2025-08-15 ENCOUNTER — OFFICE VISIT (OUTPATIENT)
Dept: URBAN - METROPOLITAN AREA MEDICAL CENTER 28 | Facility: MEDICAL CENTER | Age: 50
End: 2025-08-15

## 2025-08-28 ENCOUNTER — OFFICE VISIT (OUTPATIENT)
Dept: URBAN - METROPOLITAN AREA SURGERY CENTER 18 | Facility: SURGERY CENTER | Age: 50
End: 2025-08-28